# Patient Record
Sex: FEMALE | Race: WHITE | NOT HISPANIC OR LATINO | Employment: FULL TIME | URBAN - METROPOLITAN AREA
[De-identification: names, ages, dates, MRNs, and addresses within clinical notes are randomized per-mention and may not be internally consistent; named-entity substitution may affect disease eponyms.]

---

## 2017-03-30 DIAGNOSIS — I10 ESSENTIAL HYPERTENSION WITH GOAL BLOOD PRESSURE LESS THAN 140/90: ICD-10-CM

## 2017-03-30 NOTE — TELEPHONE ENCOUNTER
lisinopril (PRINIVIL,ZESTRIL) 5 MG tablet      Last Written Prescription Date: 10/3/16  Last Fill Quantity: 90, # refills: 1  Last Office Visit with G, [facility] or [facility] prescribing provider: 10/3/16       Potassium   Date Value Ref Range Status   10/03/2016 3.7 3.4 - 5.3 mmol/L Final     Creatinine   Date Value Ref Range Status   10/03/2016 0.84 0.52 - 1.04 mg/dL Final     BP Readings from Last 3 Encounters:   07/08/16 110/80   04/06/16 122/88   02/12/16 130/82

## 2017-03-31 RX ORDER — LISINOPRIL 5 MG/1
5 TABLET ORAL DAILY
Qty: 90 TABLET | Refills: 1 | Status: SHIPPED | OUTPATIENT
Start: 2017-03-31 | End: 2017-09-30

## 2017-04-01 DIAGNOSIS — I10 ESSENTIAL HYPERTENSION WITH GOAL BLOOD PRESSURE LESS THAN 140/90: ICD-10-CM

## 2017-04-03 RX ORDER — LISINOPRIL 5 MG/1
5 TABLET ORAL DAILY
Qty: 1 TABLET | Refills: 0 | Status: SHIPPED | OUTPATIENT
Start: 2017-04-03 | End: 2017-12-06 | Stop reason: DRUGHIGH

## 2017-04-03 NOTE — TELEPHONE ENCOUNTER
lisinopril (PRINIVIL/ZESTRIL) 5 MG tablet      Last Written Prescription Date: 3/31/17  Last Fill Quantity: 90, # refills: 1  Last Office Visit with [facility], [facility] or [facility] prescribing provider: 10/3/16       Potassium   Date Value Ref Range Status   10/03/2016 3.7 3.4 - 5.3 mmol/L Final     Creatinine   Date Value Ref Range Status   10/03/2016 0.84 0.52 - 1.04 mg/dL Final     BP Readings from Last 3 Encounters:   07/08/16 110/80   04/06/16 122/88   02/12/16 130/82

## 2017-06-17 DIAGNOSIS — J01.90 ACUTE SINUSITIS WITH SYMPTOMS > 10 DAYS: ICD-10-CM

## 2017-06-19 NOTE — TELEPHONE ENCOUNTER
FLUTICASONE PROP 50 MCG SPRAY        Last Written Prescription Date: 12/14/16  Last Fill Quantity: 48,  # refills: 1   Last Office Visit with [facility], [facility] or [facility] prescribing provider: 10/03/16

## 2017-06-20 RX ORDER — FLUTICASONE PROPIONATE 50 MCG
SPRAY, SUSPENSION (ML) NASAL
Qty: 48 G | Refills: 0 | Status: SHIPPED | OUTPATIENT
Start: 2017-06-20 | End: 2017-09-24

## 2017-09-06 DIAGNOSIS — J45.20 MILD INTERMITTENT ASTHMA WITHOUT COMPLICATION: ICD-10-CM

## 2017-09-06 RX ORDER — ALBUTEROL SULFATE 90 UG/1
AEROSOL, METERED RESPIRATORY (INHALATION)
Qty: 1 INHALER | Refills: 0 | Status: SHIPPED | OUTPATIENT
Start: 2017-09-06 | End: 2021-12-22

## 2017-09-06 NOTE — TELEPHONE ENCOUNTER
albuterol (PROAIR HFA, PROVENTIL HFA, VENTOLIN HFA) 108 (90 BASE) MCG/ACT inhaler      Last Written Prescription Date: 7/8/16  Last Fill Quantity: 1,  # refills: 1   Last Office Visit with [facility], [facility] or [facility] prescribing provider: 7/8/16

## 2017-09-06 NOTE — TELEPHONE ENCOUNTER
Medication is being filled for 1 time refill only due to:   Over due for office visit and/or labs   ANA Cronin  RN/González Garcia

## 2017-09-08 DIAGNOSIS — L40.9 SCALP PSORIASIS: ICD-10-CM

## 2017-09-08 NOTE — TELEPHONE ENCOUNTER
fluocinonide (LIDEX) 0.05 % external solution      Last Written Prescription Date: 4/6/16  Last Fill Quantity: 60ml,  # refills: 0   Last Office Visit with [facility], [facility] or [facility] prescribing provider: 10/3/16

## 2017-09-11 RX ORDER — FLUOCINONIDE TOPICAL SOLUTION USP, 0.05% 0.5 MG/ML
SOLUTION TOPICAL
Qty: 60 ML | Refills: 0 | Status: SHIPPED | OUTPATIENT
Start: 2017-09-11 | End: 2017-12-06

## 2017-09-11 NOTE — TELEPHONE ENCOUNTER
Prescription approved per [facility] Refill Protocol.  Diamante Vergara RN

## 2017-09-24 DIAGNOSIS — J01.90 ACUTE SINUSITIS WITH SYMPTOMS > 10 DAYS: ICD-10-CM

## 2017-09-25 RX ORDER — FLUTICASONE PROPIONATE 50 MCG
SPRAY, SUSPENSION (ML) NASAL
Qty: 1 BOTTLE | Refills: 0 | Status: SHIPPED | OUTPATIENT
Start: 2017-09-25 | End: 2017-12-06

## 2017-09-25 NOTE — TELEPHONE ENCOUNTER
FLUTICASONE PROP 50 MCG SPRAY        Last Written Prescription Date: 6/20/17  Last Fill Quantity: 48,  # refills: 0   Last Office Visit with [facility], [facility] or [facility] prescribing provider: 10/03/16

## 2017-09-30 DIAGNOSIS — I10 ESSENTIAL HYPERTENSION WITH GOAL BLOOD PRESSURE LESS THAN 140/90: ICD-10-CM

## 2017-10-02 RX ORDER — LISINOPRIL 5 MG/1
5 TABLET ORAL DAILY
Qty: 30 TABLET | Refills: 0 | Status: SHIPPED | OUTPATIENT
Start: 2017-10-02 | End: 2017-10-30

## 2017-10-02 NOTE — TELEPHONE ENCOUNTER
lisinopril (PRINIVIL/ZESTRIL) 5 MG tablet      Last Written Prescription Date: 3/31/17  Last Fill Quantity: 90, # refills: 1  Last Office Visit with [facility], [facility] or [facility] prescribing provider: 10/3/16       Potassium   Date Value Ref Range Status   10/03/2016 3.7 3.4 - 5.3 mmol/L Final     Creatinine   Date Value Ref Range Status   10/03/2016 0.84 0.52 - 1.04 mg/dL Final     BP Readings from Last 3 Encounters:   07/08/16 110/80   04/06/16 122/88   02/12/16 130/82

## 2017-10-29 DIAGNOSIS — J01.90 ACUTE SINUSITIS WITH SYMPTOMS > 10 DAYS: ICD-10-CM

## 2017-10-30 DIAGNOSIS — I10 ESSENTIAL HYPERTENSION WITH GOAL BLOOD PRESSURE LESS THAN 140/90: ICD-10-CM

## 2017-10-30 RX ORDER — LISINOPRIL 5 MG/1
5 TABLET ORAL DAILY
Qty: 30 TABLET | Refills: 0 | Status: SHIPPED | OUTPATIENT
Start: 2017-10-30 | End: 2017-12-06

## 2017-10-30 NOTE — TELEPHONE ENCOUNTER
Medication is being filled for 1 time refill only due to:  Patient needs labs bmp. Future labs ordered yes. Patient needs to be seen because it has been more than one year since last visit.   Geoff Tim RN

## 2017-10-30 NOTE — TELEPHONE ENCOUNTER
FLUTICASONE PROP 50 MCG SPRAY        Last Written Prescription Date: 9/25/17  Last Fill Quantity: 1,  # refills: 0   Last Office Visit with [facility], [facility] or [facility] prescribing provider: 10/03/16

## 2017-10-30 NOTE — TELEPHONE ENCOUNTER
Routing refill request to provider for review/approval because:  Mya given x1 and patient did not follow up, please advise  Geoff Tim RN

## 2017-10-31 RX ORDER — FLUTICASONE PROPIONATE 50 MCG
SPRAY, SUSPENSION (ML) NASAL
Qty: 16 ML | Refills: 0 | OUTPATIENT
Start: 2017-10-31

## 2017-11-29 DIAGNOSIS — I10 ESSENTIAL HYPERTENSION WITH GOAL BLOOD PRESSURE LESS THAN 140/90: ICD-10-CM

## 2017-11-29 RX ORDER — LISINOPRIL 5 MG/1
TABLET ORAL
Qty: 30 TABLET | Refills: 0 | OUTPATIENT
Start: 2017-11-29

## 2017-11-29 NOTE — TELEPHONE ENCOUNTER
Routing refill request to provider for review/approval because:  Mya given x1 and patient did not follow up, please advise  Patient needs to be seen because it has been more than 1 year since last office visit. 10/3/16.  Geoff Tim RN

## 2017-11-29 NOTE — TELEPHONE ENCOUNTER
Message left on personal identified answering machine asking Piper to call and schedule an appointment.  Geoff Tim RN

## 2017-12-06 ENCOUNTER — OFFICE VISIT (OUTPATIENT)
Dept: FAMILY MEDICINE | Facility: CLINIC | Age: 42
End: 2017-12-06
Payer: COMMERCIAL

## 2017-12-06 VITALS
SYSTOLIC BLOOD PRESSURE: 148 MMHG | HEART RATE: 104 BPM | HEIGHT: 63 IN | WEIGHT: 197.2 LBS | BODY MASS INDEX: 34.94 KG/M2 | DIASTOLIC BLOOD PRESSURE: 92 MMHG | TEMPERATURE: 98.7 F | OXYGEN SATURATION: 98 %

## 2017-12-06 DIAGNOSIS — J45.20 MILD INTERMITTENT ASTHMA WITHOUT COMPLICATION: ICD-10-CM

## 2017-12-06 DIAGNOSIS — I10 ESSENTIAL HYPERTENSION WITH GOAL BLOOD PRESSURE LESS THAN 140/90: Primary | ICD-10-CM

## 2017-12-06 DIAGNOSIS — Z13.220 SCREENING FOR LIPOID DISORDERS: ICD-10-CM

## 2017-12-06 DIAGNOSIS — J30.2 CHRONIC SEASONAL ALLERGIC RHINITIS, UNSPECIFIED TRIGGER: ICD-10-CM

## 2017-12-06 DIAGNOSIS — L40.9 SCALP PSORIASIS: ICD-10-CM

## 2017-12-06 PROCEDURE — 99213 OFFICE O/P EST LOW 20 MIN: CPT | Performed by: PHYSICIAN ASSISTANT

## 2017-12-06 RX ORDER — FEXOFENADINE HCL 180 MG/1
180 TABLET ORAL DAILY
Qty: 30 TABLET | Refills: 1 | COMMUNITY
Start: 2017-12-06

## 2017-12-06 RX ORDER — LISINOPRIL 10 MG/1
10 TABLET ORAL DAILY
Qty: 90 TABLET | Refills: 1 | Status: SHIPPED | OUTPATIENT
Start: 2017-12-06 | End: 2018-03-04

## 2017-12-06 RX ORDER — FLUTICASONE PROPIONATE 50 MCG
SPRAY, SUSPENSION (ML) NASAL
Qty: 1 BOTTLE | Refills: 11 | Status: SHIPPED | OUTPATIENT
Start: 2017-12-06 | End: 2019-05-05

## 2017-12-06 RX ORDER — FLUOCINONIDE TOPICAL SOLUTION USP, 0.05% 0.5 MG/ML
SOLUTION TOPICAL DAILY
Qty: 60 ML | Refills: 1 | Status: SHIPPED | OUTPATIENT
Start: 2017-12-06 | End: 2020-03-22

## 2017-12-06 NOTE — PROGRESS NOTES
"SUBJECTIVE:                                                    Emerald Pearl is a 42 year old female who presents to clinic today for the following health issues:    Hypertension Follow-up      Outpatient blood pressures are not being checked.    Low Salt Diet: no added salt    Amount of exercise or physical activity: None, has sprain in right ankle, very stress     Problems taking medications regularly: No    Medication side effects: none    Diet: regular (no restrictions)    Recent ankle sprain, improving, seen in ED a couple weeks ago  Joining the [employer] with her , who has muscular dystrophy  Allegra did help more than claritin for year round allergies, also uses flonase  She is trying to take control of her health. Has been very stressful at work, recently started working with a psychologist which helps  Denies CP or SOB     Problem list and histories reviewed & adjusted, as indicated.  Additional history: none    BP Readings from Last 3 Encounters:   12/06/17 (!) 148/92   07/08/16 110/80   04/06/16 122/88    Wt Readings from Last 3 Encounters:   12/06/17 197 lb 3.2 oz (89.4 kg)   07/08/16 198 lb 3.2 oz (89.9 kg)   04/06/16 195 lb (88.5 kg)         Labs reviewed in EPIC      ROS:  Other than noted above, general, HEENT, respiratory, cardiac, MS, and gastrointestinal systems are negative.     OBJECTIVE:                                                    BP (!) 148/92  Pulse 104  Temp 98.7  F (37.1  C) (Tympanic)  Ht 5' 2.84\" (1.596 m)  Wt 197 lb 3.2 oz (89.4 kg)  SpO2 98%  BMI 35.12 kg/m2 Body mass index is 35.12 kg/(m^2).   GENERAL: healthy, alert, well nourished, well hydrated, no distress  RESP: lungs clear to auscultation - no rales, no rhonchi, no wheezes  CV: regular rates and rhythm, normal S1 S2, no S3 or S4 and no murmur, no click or rub -       ASSESSMENT/PLAN:                                                      ASSESSMENT/PLAN:      ICD-10-CM    1. Essential hypertension with goal blood " pressure less than 140/90 I10 lisinopril (PRINIVIL/ZESTRIL) 10 MG tablet     **Basic metabolic panel FUTURE 1yr   2. 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 Chronic seasonal allergic rhinitis, unspecified trigger J30.2 fluticasone (FLONASE) 50 MCG/ACT spray   4. Mild intermittent asthma without complication J45.20    5. Screening for lipoid disorders Z13.220 Lipid panel reflex to direct LDL Fasting     Lab appointment made. Will increase lisinopril    Patient Instructions   Increase lisinopril to 10 mg daily  Check fasting labs in a couple weeks (no food or drink for 10-12 hours, except water)  Check your blood pressure in clinic at that time too    Mammogram: For [facility] ([facility], [facility]) imaging departments: call [phone] to schedule     PAP smear next year October or later    Lily Fine PA-C   [facility]

## 2017-12-06 NOTE — MR AVS SNAPSHOT
After Visit Summary   12/6/2017    Emerald Pearl    MRN: [id number]           Patient Information     Date Of Birth          1975        Visit Information        Provider Department      12/6/2017 3:20 PM Lily Fine PA-C [facility]        Today's Diagnoses     Essential hypertension with goal blood pressure less than 140/90    -  1    Scalp psoriasis        Acute sinusitis with symptoms > 10 days        Chronic seasonal allergic rhinitis, unspecified trigger        Mild intermittent asthma without complication        Screening for lipoid disorders          Care Instructions    Increase lisinopril to 10 mg daily  Check fasting labs in a couple weeks (no food or drink for 10-12 hours, except water)  Check your blood pressure in clinic at that time too    Mammogram: For [facility] ([facility], [facility]) imaging departments: call [phone] to schedule     PAP smear next year October or later          Follow-ups after your visit        Your next 10 appointments already scheduled     Dec 22, 2017  8:00 AM CST   LAB with [facility] ([facility])    [address] [address] [phone]           Please do not eat 10-12 hours before your appointment if you are coming in fasting for labs on lipids, cholesterol, or glucose (sugar). This does not apply to pregnant women. Water, hot tea and black coffee (with nothing added) are okay. Do not drink other fluids, diet soda or chew gum.              Future tests that were ordered for you today     Open Future Orders        Priority Expected Expires Ordered    **Basic metabolic panel FUTURE 1yr Routine 11/6/2018 12/6/2018 12/6/2017    Lipid panel reflex to direct LDL Fasting Routine 11/6/2018 12/6/2018 12/6/2017            Who to contact     Normal or non-critical lab and imaging results will be communicated to you by MyChart, letter or phone within 4 business days after the clinic has  "received the results. If you do not hear from us within 7 days, please contact the clinic through [employer] or phone. If you have a critical or abnormal lab result, we will notify you by phone as soon as possible.  Submit refill requests through [employer] or call your pharmacy and they will forward the refill request to us. Please allow 3 business days for your refill to be completed.          If you need to speak with a  for additional information , please call: [phone]             Additional Information About Your Visit        [employer] Information     [employer] gives you secure access to your electronic health record. If you see a primary care provider, you can also send messages to your care team and make appointments. If you have questions, please call your primary care clinic.  If you do not have a primary care provider, please call [phone] and they will assist you.        Care EveryWhere ID     This is your Care EveryWhere ID. This could be used by other organizations to access your [facility] medical records  [id number]        Your Vitals Were     Pulse Temperature Height Pulse Oximetry BMI (Body Mass Index)       104 98.7  F (37.1  C) (Tympanic) 5' 2.84\" (1.596 m) 98% 35.12 kg/m2        Blood Pressure from Last 3 Encounters:   12/06/17 (!) 148/92   07/08/16 110/80   04/06/16 122/88    Weight from Last 3 Encounters:   12/06/17 197 lb 3.2 oz (89.4 kg)   07/08/16 198 lb 3.2 oz (89.9 kg)   04/06/16 195 lb (88.5 kg)              We Performed the Following     Asthma Action Plan (AAP)          Today's Medication Changes          These changes are accurate as of: 12/6/17  4:17 PM.  If you have any questions, ask your nurse or doctor.               These medicines have changed or have updated prescriptions.        Dose/Directions    fluocinonide 0.05 % solution   Commonly known as:  LIDEX   This may have changed:  See the new instructions.   Used for:  Scalp psoriasis   Changed by:  Babatunde, " DARCIE Bautista        Apply topically daily   Quantity:  60 mL   Refills:  1       lisinopril 10 MG tablet   Commonly known as:  PRINIVIL/ZESTRIL   This may have changed:    - medication strength  - how much to take  - Another medication with the same name was removed. Continue taking this medication, and follow the directions you see here.   Used for:  Essential hypertension with goal blood pressure less than 140/90   Changed by:  Lily Fine PA-C        Dose:  10 mg   Take 1 tablet (10 mg) by mouth daily   Quantity:  90 tablet   Refills:  1            Where to get your medicines      These medications were sent to [facility]/pharmacy #[id number] - [place], [place] - [address]  [address], [place] [address]     Phone:  [phone]     fluocinonide 0.05 % solution    fluticasone 50 MCG/ACT spray    lisinopril 10 MG tablet                Primary Care Provider Office Phone # Fax #    Lily Fine PA-C [phone] [address] [address]        Equal Access to Services     [facility]: Hadii aad ku hadasho Soomaali, waaxda luqadaha, qaybta kaalmada adeegyada, waxay idiin hayalejandra mccall . So [facility] [phone].    ATENCIÓN: Si habla español, tiene a hickey disposición servicios gratuitos de asistencia lingüística. Llame al [phone].    We comply with applicable federal civil rights laws and [place] laws. We do not discriminate on the basis of race, color, national origin, age, disability, sex, sexual orientation, or gender identity.            Thank you!     Thank you for choosing [facility]  for your care. Our goal is always to provide you with excellent care. Hearing back from our patients is one way we can continue to improve our services. Please take a few minutes to complete the written survey that you may receive in the mail after your visit with us. Thank you!             Your Updated Medication List - Protect others around you:  Learn how to safely use, store and throw away your medicines at www.disposemymeds.org.          This list is accurate as of: 12/6/17  4:17 PM.  Always use your most recent med list.                   Brand Name Dispense Instructions for use Diagnosis    albuterol 108 (90 BASE) MCG/ACT Inhaler    PROAIR HFA/PROVENTIL HFA/VENTOLIN HFA    1 Inhaler    Inhale 2 puffs krishna lungs q 6 hrs for SOB/wheezing overdue for office appt with  Pankratz plz make appt now before further refills Sept 2017    Mild intermittent asthma without complication       diphenhydrAMINE 25 MG tablet    BENADRYL    60 tablet    Take 1-2 tablets (25-50 mg) by mouth every 6 hours as needed for itching or allergies        fexofenadine 180 MG tablet    ALLEGRA    30 tablet    Take 1 tablet (180 mg) by mouth daily        fluocinonide 0.05 % solution    LIDEX    60 mL    Apply topically daily    Scalp psoriasis       fluticasone 50 MCG/ACT spray    FLONASE    1 Bottle    SPRAY 1-2 SPRAYS INTO BOTH NOSTRILS DAILY (Needs follow-up appointment for this medication)    Acute sinusitis with symptoms > 10 days       lisinopril 10 MG tablet    PRINIVIL/ZESTRIL    90 tablet    Take 1 tablet (10 mg) by mouth daily    Essential hypertension with goal blood pressure less than 140/90       Olopatadine HCl 0.7 % Soln     25 mL    Apply 1 drop to eye 2 times daily    Seasonal allergic rhinitis

## 2017-12-06 NOTE — NURSING NOTE
"Chief Complaint   Patient presents with     Recheck Medication       Initial BP (!) 148/92  Pulse 104  Temp 98.7  F (37.1  C) (Tympanic)  Ht 5' 2.84\" (1.596 m)  Wt 197 lb 3.2 oz (89.4 kg)  SpO2 98%  BMI 35.12 kg/m2 Estimated body mass index is 35.12 kg/(m^2) as calculated from the following:    Height as of this encounter: 5' 2.84\" (1.596 m).    Weight as of this encounter: 197 lb 3.2 oz (89.4 kg).  Medication Reconciliation: complete   Sherine Chung CMA  "

## 2017-12-06 NOTE — PATIENT INSTRUCTIONS
Increase lisinopril to 10 mg daily  Check fasting labs in a couple weeks (no food or drink for 10-12 hours, except water)  Check your blood pressure in clinic at that time too    Mammogram: For [facility] ([place] [place]) imaging departments: call [phone] to schedule     PAP smear next year October or later

## 2017-12-07 ASSESSMENT — ASTHMA QUESTIONNAIRES: ACT_TOTALSCORE: 25

## 2017-12-11 ENCOUNTER — ALLIED HEALTH/NURSE VISIT (OUTPATIENT)
Dept: FAMILY MEDICINE | Facility: CLINIC | Age: 42
End: 2017-12-11
Payer: COMMERCIAL

## 2017-12-11 VITALS — DIASTOLIC BLOOD PRESSURE: 86 MMHG | SYSTOLIC BLOOD PRESSURE: 136 MMHG

## 2017-12-11 DIAGNOSIS — I10 HTN (HYPERTENSION): Primary | ICD-10-CM

## 2017-12-11 PROCEDURE — 99207 ZZC NO CHARGE NURSE ONLY: CPT | Performed by: PHYSICIAN ASSISTANT

## 2017-12-11 NOTE — MR AVS SNAPSHOT
After Visit Summary   12/11/2017    Emerald Pearl    MRN: [id number]           Patient Information     Date Of Birth          1975        Visit Information        Provider Department      12/11/2017 4:48 PM Lily Fine PA-C [facility]        Today's Diagnoses     HTN (hypertension)    -  1       Follow-ups after your visit        Your next 10 appointments already scheduled     Dec 22, 2017  8:00 AM CST   LAB with [facility] ([facility])    [address] [address]   [phone]           Please do not eat 10-12 hours before your appointment if you are coming in fasting for labs on lipids, cholesterol, or glucose (sugar). This does not apply to pregnant women. Water, hot tea and black coffee (with nothing added) are okay. Do not drink other fluids, diet soda or chew gum.            Dec 22, 2017  9:00 AM CST   Nurse Only with [facility] CMA/LPN   [facility] ([facility])    [address] [address]   [phone]              Who to contact     Normal or non-critical lab and imaging results will be communicated to you by MyChart, letter or phone within 4 business days after the clinic has received the results. If you do not hear from us within 7 days, please contact the clinic through [email or web address] or phone. If you have a critical or abnormal lab result, we will notify you by phone as soon as possible.  Submit refill requests through [employer] or call your pharmacy and they will forward the refill request to us. Please allow 3 business days for your refill to be completed.          If you need to speak with a  for additional information , please call: [phone]             Additional Information About Your Visit        [email or web address] Information     [employer] gives you secure access to your electronic health record. If you see a primary care provider, you can also send messages to your care team  and make appointments. If you have questions, please call your primary care clinic.  If you do not have a primary care provider, please call [phone] and they will assist you.        Care EveryWhere ID     This is your Care EveryWhere ID. This could be used by other organizations to access your [facility] medical records  [id number]         Blood Pressure from Last 3 Encounters:   12/11/17 136/86   12/06/17 (!) 148/92   07/08/16 110/80    Weight from Last 3 Encounters:   12/06/17 197 lb 3.2 oz (89.4 kg)   07/08/16 198 lb 3.2 oz (89.9 kg)   04/06/16 195 lb (88.5 kg)              Today, you had the following     No orders found for display       Primary Care Provider Office Phone # Fax #    Lily Fine PA-C [phone] [address] [address]        Equal Access to Services     [facility] : Hadii aad ku hadasho Soomaali, waaxda luqadaha, qaybta kaalmada adeegyada, waxay emaniin hayludan bert mccall . So [facility] [phone].    ATENCIÓN: Si hipolito lyle, tiene a hickey disposición servicios gratuitos de asistencia lingüística. Llame al [phone].    We comply with applicable federal civil rights laws and [place] laws. We do not discriminate on the basis of race, color, national origin, age, disability, sex, sexual orientation, or gender identity.            Thank you!     Thank you for choosing [facility]  for your care. Our goal is always to provide you with excellent care. Hearing back from our patients is one way we can continue to improve our services. Please take a few minutes to complete the written survey that you may receive in the mail after your visit with us. Thank you!             Your Updated Medication List - Protect others around you: Learn how to safely use, store and throw away your medicines at www.disposemymeds.org.          This list is accurate as of: 12/11/17  4:48 PM.  Always use your most recent med list.                   Brand Name  Dispense Instructions for use Diagnosis    albuterol 108 (90 BASE) MCG/ACT Inhaler    PROAIR HFA/PROVENTIL HFA/VENTOLIN HFA    1 Inhaler    Inhale 2 puffs krishna lungs q 6 hrs for SOB/wheezing overdue for office appt with  Pankratz plz make appt now before further refills Sept 2017    Mild intermittent asthma without complication       diphenhydrAMINE 25 MG tablet    BENADRYL    60 tablet    Take 1-2 tablets (25-50 mg) by mouth every 6 hours as needed for itching or allergies        fexofenadine 180 MG tablet    ALLEGRA    30 tablet    Take 1 tablet (180 mg) by mouth daily        fluocinonide 0.05 % solution    LIDEX    60 mL    Apply topically daily    Scalp psoriasis       fluticasone 50 MCG/ACT spray    FLONASE    1 Bottle    SPRAY 1-2 SPRAYS INTO BOTH NOSTRILS DAILY (Needs follow-up appointment for this medication)    Chronic seasonal allergic rhinitis, unspecified trigger       lisinopril 10 MG tablet    PRINIVIL/ZESTRIL    90 tablet    Take 1 tablet (10 mg) by mouth daily    Essential hypertension with goal blood pressure less than 140/90       Olopatadine HCl 0.7 % Soln     25 mL    Apply 1 drop to eye 2 times daily    Seasonal allergic rhinitis

## 2017-12-11 NOTE — PROGRESS NOTES
Emerald Pearl is enrolled/participating in the retail pharmacy Blood Pressure Goals Achievement Program (BPGAP).  Emerald Pearl was evaluated at [facility] on December 11, 2017 at which time her blood pressure was:    BP Readings from Last 3 Encounters:   12/11/17 136/86   12/06/17 (!) 148/92   07/08/16 110/80     Reviewed lifestyle modifications for blood pressure control and reduction: including making healthy food choices, managing weight, getting regular exercise, smoking cessation, reducing alcohol consumption, monitoring blood pressure regularly.     Emerald Pearl is not experiencing symptoms.    Follow-Up: BP is at goal of < 140/90mmHg (patient 18+ years of age with or without diabetes).  Recommended follow-up at pharmacy in 6 months.     Recommendation to Provider: no change    Emerald Pearl was evaluated for enrollment into the PGEN study today.    Patient eligible for enrollment:  No  Patient interested in enrollment:  No    Completed by:     Libia Mcneill, PharmD  [facility]  [phone]

## 2017-12-20 ENCOUNTER — TELEPHONE (OUTPATIENT)
Dept: FAMILY MEDICINE | Facility: CLINIC | Age: 42
End: 2017-12-20

## 2017-12-20 ENCOUNTER — OFFICE VISIT (OUTPATIENT)
Dept: FAMILY MEDICINE | Facility: CLINIC | Age: 42
End: 2017-12-20
Payer: COMMERCIAL

## 2017-12-20 VITALS
BODY MASS INDEX: 34.45 KG/M2 | DIASTOLIC BLOOD PRESSURE: 94 MMHG | HEART RATE: 95 BPM | OXYGEN SATURATION: 100 % | TEMPERATURE: 98.5 F | HEIGHT: 63 IN | WEIGHT: 194.4 LBS | SYSTOLIC BLOOD PRESSURE: 142 MMHG

## 2017-12-20 DIAGNOSIS — R07.0 THROAT PAIN: Primary | ICD-10-CM

## 2017-12-20 LAB
DEPRECATED S PYO AG THROAT QL EIA: NORMAL
SPECIMEN SOURCE: NORMAL

## 2017-12-20 PROCEDURE — 87880 STREP A ASSAY W/OPTIC: CPT | Performed by: FAMILY MEDICINE

## 2017-12-20 PROCEDURE — 99213 OFFICE O/P EST LOW 20 MIN: CPT | Performed by: FAMILY MEDICINE

## 2017-12-20 PROCEDURE — 87070 CULTURE OTHR SPECIMN AEROBIC: CPT | Performed by: FAMILY MEDICINE

## 2017-12-20 NOTE — NURSING NOTE
"Chief Complaint   Patient presents with     Throat Pain       Initial BP (!) 143/102 (BP Location: Right arm, Patient Position: Sitting, Cuff Size: Adult Large)  Pulse 95  Temp 98.5  F (36.9  C) (Tympanic)  Ht 5' 2.84\" (1.596 m)  Wt 194 lb 6.4 oz (88.2 kg)  SpO2 100%  BMI 34.61 kg/m2 Estimated body mass index is 34.61 kg/(m^2) as calculated from the following:    Height as of this encounter: 5' 2.84\" (1.596 m).    Weight as of this encounter: 194 lb 6.4 oz (88.2 kg).  Medication Reconciliation: complete   Monica Alvarez CMA  "

## 2017-12-20 NOTE — TELEPHONE ENCOUNTER
Reason for call:  Patient reporting a symptom    Symptom or request: Emerald vomited last evening and has a very sore throat.  She states that her son had strep and she wants to make sure she doesn't have it.  Please call and assess. Thank you..Syl Baum    Duration (how long have symptoms been present): unknown    Have you been treated for this before? No    Additional comments: none    Phone Number patient can be reached at:  Cell number on file:    Telephone Information:   Mobile [phone]       Best Time:  Any time    Can we leave a detailed message on this number:  YES    Call taken on 12/20/2017 at 8:29 AM by Syl Baum

## 2017-12-20 NOTE — MR AVS SNAPSHOT
After Visit Summary   12/20/2017    Emerald Pearl    MRN: [id number]           Patient Information     Date Of Birth          1975        Visit Information        Provider Department      12/20/2017 2:00 PM Rangel Bernal MD [facility]        Today's Diagnoses     Throat pain    -  1       Follow-ups after your visit        Your next 10 appointments already scheduled     Dec 22, 2017  9:00 AM CST   Nurse Only with [place] HU CMA/LPN   [facility]    [address] [address] [phone]            Jan 12, 2018  7:45 AM CST   LAB with HU LAB   [facility]    [address] [id number] [phone]           Please do not eat 10-12 hours before your appointment if you are coming in fasting for labs on lipids, cholesterol, or glucose (sugar). This does not apply to pregnant women. Water, hot tea and black coffee (with nothing added) are okay. Do not drink other fluids, diet soda or chew gum.              Who to contact     Normal or non-critical lab and imaging results will be communicated to you by LoopIthart, letter or phone within 4 business days after the clinic has received the results. If you do not hear from us within 7 days, please contact the clinic through Buysightt or phone. If you have a critical or abnormal lab result, we will notify you by phone as soon as possible.  Submit refill requests through Shopparity or call your pharmacy and they will forward the refill request to us. Please allow 3 business days for your refill to be completed.          If you need to speak with a  for additional information , please call: [phone]             Additional Information About Your Visit        Shopparity Information     Shopparity gives you secure access to your electronic health record. If you see a primary care provider, you can also send messages to your care team and make  "appointments. If you have questions, please call your primary care clinic.  If you do not have a primary care provider, please call [phone] and they will assist you.        Care EveryWhere ID     This is your Care EveryWhere ID. This could be used by other organizations to access your [place] medical records  [id number]        Your Vitals Were     Pulse Temperature Height Pulse Oximetry BMI (Body Mass Index)       95 98.5  F (36.9  C) (Tympanic) 5' 2.84\" (1.596 m) 100% 34.61 kg/m2        Blood Pressure from Last 3 Encounters:   12/20/17 (!) 142/94   12/11/17 136/86   12/06/17 (!) 148/92    Weight from Last 3 Encounters:   12/20/17 194 lb 6.4 oz (88.2 kg)   12/06/17 197 lb 3.2 oz (89.4 kg)   07/08/16 198 lb 3.2 oz (89.9 kg)              We Performed the Following     Strep, Rapid Screen     Throat Culture Aerobic Bacterial        Primary Care Provider Office Phone # Fax #    Lily Fine PA-C [phone] [address] [facility] [address]        Equal Access to Services     DREW HARDING AH: Hadii romeo patteno Sojoel, waaxda luqadaha, qaybta kaalmada adeegyada, laith dacosta. So [facility] [phone].    ATENCIÓN: Si habla español, tiene a hickey disposición servicios gratuitos de asistencia lingüística. Llame al [phone].    We comply with applicable federal civil rights laws and [place] laws. We do not discriminate on the basis of race, color, national origin, age, disability, sex, sexual orientation, or gender identity.            Thank you!     Thank you for choosing [facility]  for your care. Our goal is always to provide you with excellent care. Hearing back from our patients is one way we can continue to improve our services. Please take a few minutes to complete the written survey that you may receive in the mail after your visit with us. Thank you!             Your Updated Medication List - Protect others around you: Learn how to " safely use, store and throw away your medicines at www.disposemymeds.org.          This list is accurate as of: 12/20/17 11:59 PM.  Always use your most recent med list.                   Brand Name Dispense Instructions for use Diagnosis    albuterol 108 (90 BASE) MCG/ACT Inhaler    PROAIR HFA/PROVENTIL HFA/VENTOLIN HFA    1 Inhaler    Inhale 2 puffs krishna lungs q 6 hrs for SOB/wheezing overdue for office appt with  Pankratz plz make appt now before further refills Sept 2017    Mild intermittent asthma without complication       diphenhydrAMINE 25 MG tablet    BENADRYL    60 tablet    Take 1-2 tablets (25-50 mg) by mouth every 6 hours as needed for itching or allergies        fexofenadine 180 MG tablet    ALLEGRA    30 tablet    Take 1 tablet (180 mg) by mouth daily        fluocinonide 0.05 % solution    LIDEX    60 mL    Apply topically daily    Scalp psoriasis       fluticasone 50 MCG/ACT spray    FLONASE    1 Bottle    SPRAY 1-2 SPRAYS INTO BOTH NOSTRILS DAILY (Needs follow-up appointment for this medication)    Chronic seasonal allergic rhinitis, unspecified trigger       lisinopril 10 MG tablet    PRINIVIL/ZESTRIL    90 tablet    Take 1 tablet (10 mg) by mouth daily    Essential hypertension with goal blood pressure less than 140/90       Olopatadine HCl 0.7 % Soln     25 mL    Apply 1 drop to eye 2 times daily    Seasonal allergic rhinitis

## 2017-12-20 NOTE — TELEPHONE ENCOUNTER
Appointment made for  this afternoon. Son has a sore throat but has not been seen yet. He is being seen at [facility] this afternoon.  Geoff Tim RN

## 2017-12-20 NOTE — PROGRESS NOTES
SUBJECTIVE:                                                    Emerald Pearl is a 42 year old female who presents to clinic today for the following health issues:    ENT Symptoms             Symptoms: cc Present Absent Comment   Fever/Chills  x  chills   Fatigue  x     Muscle Aches   x    Eye Irritation   x    Sneezing   x    Nasal Gene/Drg   x    Sinus Pressure/Pain   x    Loss of smell   x    Dental pain   x    Sore Throat x x     Swollen Glands  x     Ear Pain/Fullness  x     Cough  x     Wheeze   x    Chest Pain   x    Shortness of breath   x    Rash   x    Other  x  dizziness and vomited this morning.     Symptom duration:  two days ago   Symptom severity:  moderate   Treatments tried:  ibuprofen   Contacts:  son was sick     Problem list and histories reviewed & adjusted, as indicated.  Additional history:     Patient Active Problem List   Diagnosis     HTN (hypertension)     Scalp psoriasis     Mild intermittent asthma     Essential hypertension with goal blood pressure less than 140/90     Past Surgical History:   Procedure Laterality Date     NO HISTORY OF SURGERY         Social History   Substance Use Topics     Smoking status: Never Smoker     Smokeless tobacco: Never Used     Alcohol use 0.0 oz/week     0 Standard drinks or equivalent per week      Comment: occ     Family History   Problem Relation Age of Onset     Hypertension Mother      Lipids Mother      Blood Disease Father      leukemia     Hypertension Father      Lipids Father      Hypertension Paternal Grandfather      CEREBROVASCULAR DISEASE Paternal Grandfather      Muscular Dystrophy Son      mild.  has too     Muscular Dystrophy Son      mild.  has too     Breast Cancer Maternal Aunt 60     Breast Cancer Other          ROS:  Constitutional, HEENT, cardiovascular, pulmonary, gi and gu systems are negative, except as otherwise noted.    OBJECTIVE:                                                    BP (!) 143/102 (BP Location:  "Right arm, Patient Position: Sitting, Cuff Size: Adult Large)  Pulse 95  Temp 98.5  F (36.9  C) (Tympanic)  Ht 5' 2.84\" (1.596 m)  Wt 194 lb 6.4 oz (88.2 kg)  SpO2 100%  BMI 34.61 kg/m2 Body mass index is 34.61 kg/(m^2).   GENERAL: healthy, alert, well nourished, well hydrated, no distress  HENT: ear canals- normal; TMs- normal; Nose- normal; Mouth- no ulcers, no lesions  NECK: no tenderness, no adenopathy, no asymmetry, no masses, no stiffness; thyroid- normal to palpation  RESP: lungs clear to auscultation - no rales, no rhonchi, no wheezes  CV: regular rates and rhythm, normal S1 S2, no S3 or S4 and no murmur, no click or rub -  ABDOMEN: soft, no tenderness, no  hepatosplenomegaly, no masses, normal bowel sounds       ASSESSMENT/PLAN:                                                    (R07.0) Throat pain  (primary encounter diagnosis)  Plan: Strep, Rapid Screen, Throat Culture Aerobic         Bacterial    Most likelu URI viral.  return to clinic or call if unimproved in 3-4 days sooner if worse.     reports that she has never smoked. She has never used smokeless tobacco.      [facility]  "

## 2017-12-23 LAB
BACTERIA SPEC CULT: NORMAL
SPECIMEN SOURCE: NORMAL

## 2017-12-29 ENCOUNTER — ALLIED HEALTH/NURSE VISIT (OUTPATIENT)
Dept: FAMILY MEDICINE | Facility: CLINIC | Age: 42
End: 2017-12-29
Payer: COMMERCIAL

## 2017-12-29 VITALS — DIASTOLIC BLOOD PRESSURE: 86 MMHG | SYSTOLIC BLOOD PRESSURE: 128 MMHG

## 2017-12-29 DIAGNOSIS — I10 HTN (HYPERTENSION): Primary | ICD-10-CM

## 2017-12-29 PROCEDURE — 99207 ZZC NO CHARGE NURSE ONLY: CPT | Performed by: PHYSICIAN ASSISTANT

## 2017-12-29 NOTE — MR AVS SNAPSHOT
After Visit Summary   12/29/2017    Emerald Pearl    MRN: [id number]           Patient Information     Date Of Birth          1975        Visit Information        Provider Department      12/29/2017 5:37 PM Lily Fine PA-C [facility]        Today's Diagnoses     HTN (hypertension)    -  1       Follow-ups after your visit        Your next 10 appointments already scheduled     Jan 12, 2018  7:45 AM [facility]   LAB with [facility] ([facility])    [address] [address] [phone]           Please do not eat 10-12 hours before your appointment if you are coming in fasting for labs on lipids, cholesterol, or glucose (sugar). This does not apply to pregnant women. Water, hot tea and black coffee (with nothing added) are okay. Do not drink other fluids, diet soda or chew gum.              Who to contact     Normal or non-critical lab and imaging results will be communicated to you by xG Technologyhart, letter or phone within 4 business days after the clinic has received the results. If you do not hear from us within 7 days, please contact the clinic through xG Technologyhart or phone. If you have a critical or abnormal lab result, we will notify you by phone as soon as possible.  Submit refill requests through Holograam or call your pharmacy and they will forward the refill request to us. Please allow 3 business days for your refill to be completed.          If you need to speak with a  for additional information , please call: [phone]             Additional Information About Your Visit        Holograam Information     Holograam gives you secure access to your electronic health record. If you see a primary care provider, you can also send messages to your care team and make appointments. If you have questions, please call your primary care clinic.  If you do not have a primary care provider, please call [phone] and they will assist  you.        Care EveryWhere ID     This is your Care EveryWhere ID. This could be used by other organizations to access your [place] medical records  [id number]         Blood Pressure from Last 3 Encounters:   12/29/17 128/86   12/20/17 (!) 142/94   12/11/17 136/86    Weight from Last 3 Encounters:   12/20/17 194 lb 6.4 oz (88.2 kg)   12/06/17 197 lb 3.2 oz (89.4 kg)   07/08/16 198 lb 3.2 oz (89.9 kg)              Today, you had the following     No orders found for display       Primary Care Provider Office Phone # Fax #    Lily DARCIE Fine [phone]       [address] [facility] [address]        Equal Access to Services     DREW HARDING : Hadii aad ku hadasho Soomaali, waaxda luqadaha, qaybta kaalmada adeegyada, laith mccall . So [facility] [phone].    ATENCIÓN: Si habla español, tiene a hickey disposición servicios gratuitos de asistencia lingüística. Llame al [phone].    We comply with applicable federal civil rights laws and [place] laws. We do not discriminate on the basis of race, color, national origin, age, disability, sex, sexual orientation, or gender identity.            Thank you!     Thank you for choosing [facility]  for your care. Our goal is always to provide you with excellent care. Hearing back from our patients is one way we can continue to improve our services. Please take a few minutes to complete the written survey that you may receive in the mail after your visit with us. Thank you!             Your Updated Medication List - Protect others around you: Learn how to safely use, store and throw away your medicines at www.disposemymeds.org.          This list is accurate as of: 12/29/17  5:44 PM.  Always use your most recent med list.                   Brand Name Dispense Instructions for use Diagnosis    albuterol 108 (90 BASE) MCG/ACT Inhaler    PROAIR HFA/PROVENTIL HFA/VENTOLIN HFA    1 Inhaler    Inhale 2 puffs krishna lungs q  6 hrs for SOB/wheezing overdue for office appt with  Pankratz plz make appt now before further refills Sept 2017    Mild intermittent asthma without complication       diphenhydrAMINE 25 MG tablet    BENADRYL    60 tablet    Take 1-2 tablets (25-50 mg) by mouth every 6 hours as needed for itching or allergies        fexofenadine 180 MG tablet    ALLEGRA    30 tablet    Take 1 tablet (180 mg) by mouth daily        fluocinonide 0.05 % solution    LIDEX    60 mL    Apply topically daily    Scalp psoriasis       fluticasone 50 MCG/ACT spray    FLONASE    1 Bottle    SPRAY 1-2 SPRAYS INTO BOTH NOSTRILS DAILY (Needs follow-up appointment for this medication)    Chronic seasonal allergic rhinitis, unspecified trigger       lisinopril 10 MG tablet    PRINIVIL/ZESTRIL    90 tablet    Take 1 tablet (10 mg) by mouth daily    Essential hypertension with goal blood pressure less than 140/90       Olopatadine HCl 0.7 % Soln     25 mL    Apply 1 drop to eye 2 times daily    Seasonal allergic rhinitis

## 2017-12-29 NOTE — NURSING NOTE
Emerald Pearl is enrolled/participating in the retail pharmacy Blood Pressure Goals Achievement Program (BPGAP).  Emerald Pearl was evaluated at [facility] on December 29, 2017 at which time her blood pressure was:    BP Readings from Last 3 Encounters:   12/29/17 128/86   12/20/17 (!) 142/94   12/11/17 136/86     Reviewed lifestyle modifications for blood pressure control and reduction: including making healthy food choices, managing weight, getting regular exercise, smoking cessation, reducing alcohol consumption, monitoring blood pressure regularly.     Emerald Pearl is not experiencing symptoms.    Follow-Up: BP is at goal of < 140/90mmHg (patient 18+ years of age with or without diabetes).  Recommended follow-up at pharmacy in 6 months.     Recommendation to Provider: None    Emerald Pearl was evaluated for enrollment into the PGEN study today.    Patient eligible for enrollment:  No  Patient interested in enrollment:  No    Completed by: Ashley Diamond, PharmD  [facility]  [phone]

## 2018-01-02 ENCOUNTER — ALLIED HEALTH/NURSE VISIT (OUTPATIENT)
Dept: FAMILY MEDICINE | Facility: CLINIC | Age: 43
End: 2018-01-02
Payer: COMMERCIAL

## 2018-01-02 ENCOUNTER — MYC MEDICAL ADVICE (OUTPATIENT)
Dept: FAMILY MEDICINE | Facility: CLINIC | Age: 43
End: 2018-01-02

## 2018-01-02 DIAGNOSIS — Z11.1 SCREENING EXAMINATION FOR PULMONARY TUBERCULOSIS: Primary | ICD-10-CM

## 2018-01-02 PROCEDURE — 99207 ZZC NO CHARGE NURSE ONLY: CPT

## 2018-01-02 PROCEDURE — 86580 TB INTRADERMAL TEST: CPT

## 2018-01-03 ENCOUNTER — TELEPHONE (OUTPATIENT)
Dept: FAMILY MEDICINE | Facility: CLINIC | Age: 43
End: 2018-01-03

## 2018-01-05 ENCOUNTER — MYC MEDICAL ADVICE (OUTPATIENT)
Dept: FAMILY MEDICINE | Facility: CLINIC | Age: 43
End: 2018-01-05

## 2018-01-05 ENCOUNTER — ALLIED HEALTH/NURSE VISIT (OUTPATIENT)
Dept: FAMILY MEDICINE | Facility: CLINIC | Age: 43
End: 2018-01-05
Payer: COMMERCIAL

## 2018-01-05 DIAGNOSIS — Z11.1 VISIT FOR MANTOUX TEST: Primary | ICD-10-CM

## 2018-01-05 PROCEDURE — 99207 ZZC NO CHARGE NURSE ONLY: CPT

## 2018-01-05 NOTE — MR AVS SNAPSHOT
After Visit Summary   1/5/2018    Emerald Pearl    MRN: [id number]           Patient Information     Date Of Birth          1975        Visit Information        Provider Department      1/5/2018 9:00 AM [place] HU RN [facility]        Care Instructions    [place] results:   No induration.  No swelling.  No redness.    Diamante Vergara RN            Follow-ups after your visit        Your next 10 appointments already scheduled     Jan 05, 2018  9:00 AM CST   Nurse Only with [place] HU RN   [facility]    [address] [place] [address]   [phone]            Jan 12, 2018  7:45 AM CST   LAB with HU LAB   [facility]    [address] [place] [address]   [phone]           Please do not eat 10-12 hours before your appointment if you are coming in fasting for labs on lipids, cholesterol, or glucose (sugar). This does not apply to pregnant women. Water, hot tea and black coffee (with nothing added) are okay. Do not drink other fluids, diet soda or chew gum.              Who to contact     Normal or non-critical lab and imaging results will be communicated to you by myseekithart, letter or phone within 4 business days after the clinic has received the results. If you do not hear from us within 7 days, please contact the clinic through myseekithart or phone. If you have a critical or abnormal lab result, we will notify you by phone as soon as possible.  Submit refill requests through The Poshpacker or call your pharmacy and they will forward the refill request to us. Please allow 3 business days for your refill to be completed.          If you need to speak with a  for additional information , please call: [phone]             Additional Information About Your Visit        The Poshpacker Information     The Poshpacker gives you secure access to your electronic health record. If you see a primary care provider, you can  also send messages to your care team and make appointments. If you have questions, please call your primary care clinic.  If you do not have a primary care provider, please call [phone] and they will assist you.        Care EveryWhere ID     This is your Care EveryWhere ID. This could be used by other organizations to access your [place] medical records  [id number]         Blood Pressure from Last 3 Encounters:   12/29/17 128/86   12/20/17 (!) 142/94   12/11/17 136/86    Weight from Last 3 Encounters:   12/20/17 194 lb 6.4 oz (88.2 kg)   12/06/17 197 lb 3.2 oz (89.4 kg)   07/08/16 198 lb 3.2 oz (89.9 kg)              Today, you had the following     No orders found for display       Primary Care Provider Office Phone # Fax #    Lily Fine PA-C [phone] [address] [facility] [address]        Equal Access to Services     DREW HARDING : Hadii aad ku hadasho Soomaali, waaxda luqadaha, qaybta kaalmada adeegyada, waxay idiin hayaan adeeg jaron mccall . So [facility] [phone].    ATENCIÓN: Si habla español, tiene a hickey disposición servicios gratuitos de asistencia lingüística. Ll[facility] [phone].    We comply with applicable federal civil rights laws and [place] laws. We do not discriminate on the basis of race, color, national origin, age, disability, sex, sexual orientation, or gender identity.            Thank you!     Thank you for choosing [facility]  for your care. Our goal is always to provide you with excellent care. Hearing back from our patients is one way we can continue to improve our services. Please take a few minutes to complete the written survey that you may receive in the mail after your visit with us. Thank you!             Your Updated Medication List - Protect others around you: Learn how to safely use, store and throw away your medicines at www.disposemymeds.org.          This list is accurate as of: 1/5/18  8:46 AM.  Always use your most  recent med list.                   Brand Name Dispense Instructions for use Diagnosis    albuterol 108 (90 BASE) MCG/ACT Inhaler    PROAIR HFA/PROVENTIL HFA/VENTOLIN HFA    1 Inhaler    Inhale 2 puffs krishna lungs q 6 hrs for SOB/wheezing overdue for office appt with  Pankratz plz make appt now before further refills Sept 2017    Mild intermittent asthma without complication       diphenhydrAMINE 25 MG tablet    BENADRYL    60 tablet    Take 1-2 tablets (25-50 mg) by mouth every 6 hours as needed for itching or allergies        fexofenadine 180 MG tablet    ALLEGRA    30 tablet    Take 1 tablet (180 mg) by mouth daily        fluocinonide 0.05 % solution    LIDEX    60 mL    Apply topically daily    Scalp psoriasis       fluticasone 50 MCG/ACT spray    FLONASE    1 Bottle    SPRAY 1-2 SPRAYS INTO BOTH NOSTRILS DAILY (Needs follow-up appointment for this medication)    Chronic seasonal allergic rhinitis, unspecified trigger       lisinopril 10 MG tablet    PRINIVIL/ZESTRIL    90 tablet    Take 1 tablet (10 mg) by mouth daily    Essential hypertension with goal blood pressure less than 140/90       Olopatadine HCl 0.7 % Soln     25 mL    Apply 1 drop to eye 2 times daily    Seasonal allergic rhinitis

## 2018-01-12 DIAGNOSIS — Z13.220 SCREENING FOR LIPOID DISORDERS: ICD-10-CM

## 2018-01-12 DIAGNOSIS — I10 ESSENTIAL HYPERTENSION WITH GOAL BLOOD PRESSURE LESS THAN 140/90: ICD-10-CM

## 2018-01-12 LAB
ANION GAP SERPL CALCULATED.3IONS-SCNC: 4 MMOL/L (ref 3–14)
BUN SERPL-MCNC: 11 MG/DL (ref 7–30)
CALCIUM SERPL-MCNC: 8.7 MG/DL (ref 8.5–10.1)
CHLORIDE SERPL-SCNC: 106 MMOL/L (ref 94–109)
CHOLEST SERPL-MCNC: 188 MG/DL
CO2 SERPL-SCNC: 26 MMOL/L (ref 20–32)
CREAT SERPL-MCNC: 0.85 MG/DL (ref 0.52–1.04)
GFR SERPL CREATININE-BSD FRML MDRD: 73 ML/MIN/1.7M2
GLUCOSE SERPL-MCNC: 94 MG/DL (ref 70–99)
HDLC SERPL-MCNC: 65 MG/DL
LDLC SERPL CALC-MCNC: 104 MG/DL
NONHDLC SERPL-MCNC: 123 MG/DL
POTASSIUM SERPL-SCNC: 4.2 MMOL/L (ref 3.4–5.3)
SODIUM SERPL-SCNC: 136 MMOL/L (ref 133–144)
TRIGL SERPL-MCNC: 97 MG/DL

## 2018-01-12 PROCEDURE — 36415 COLL VENOUS BLD VENIPUNCTURE: CPT | Performed by: PHYSICIAN ASSISTANT

## 2018-01-12 PROCEDURE — 80048 BASIC METABOLIC PNL TOTAL CA: CPT | Performed by: PHYSICIAN ASSISTANT

## 2018-01-12 PROCEDURE — 80061 LIPID PANEL: CPT | Performed by: PHYSICIAN ASSISTANT

## 2018-02-07 ENCOUNTER — HOSPITAL ENCOUNTER (OUTPATIENT)
Dept: MAMMOGRAPHY | Facility: CLINIC | Age: 43
Discharge: HOME | End: 2018-02-07
Attending: PHYSICIAN ASSISTANT | Admitting: PHYSICIAN ASSISTANT
Payer: COMMERCIAL

## 2018-02-07 DIAGNOSIS — Z12.31 VISIT FOR SCREENING MAMMOGRAM: ICD-10-CM

## 2018-02-07 PROCEDURE — 77067 SCR MAMMO BI INCL CAD: CPT

## 2018-02-12 ENCOUNTER — MYC MEDICAL ADVICE (OUTPATIENT)
Dept: FAMILY MEDICINE | Facility: CLINIC | Age: 43
End: 2018-02-12

## 2018-02-12 ENCOUNTER — TELEPHONE (OUTPATIENT)
Dept: FAMILY MEDICINE | Facility: CLINIC | Age: 43
End: 2018-02-12

## 2018-02-12 DIAGNOSIS — Z20.828 EXPOSURE TO INFLUENZA: Primary | ICD-10-CM

## 2018-02-12 DIAGNOSIS — Z20.828 EXPOSURE TO INFLUENZA: ICD-10-CM

## 2018-02-12 RX ORDER — OSELTAMIVIR PHOSPHATE 45 MG/1
45 CAPSULE ORAL 2 TIMES DAILY
Qty: 28 CAPSULE | Refills: 0 | Status: SHIPPED | OUTPATIENT
Start: 2018-02-12 | End: 2018-02-12

## 2018-02-12 RX ORDER — OSELTAMIVIR PHOSPHATE 45 MG/1
45 CAPSULE ORAL 2 TIMES DAILY
Qty: 28 CAPSULE | Refills: 0 | Status: SHIPPED | OUTPATIENT
Start: 2018-02-12 | End: 2018-07-30

## 2018-02-12 NOTE — TELEPHONE ENCOUNTER
Pharmacist calling needing clarification on Tamiflu script that was send over to them.  It is not normally prescribed for 2 weeks.  Please clarify and call.  Thank you..Syl Baum

## 2018-02-12 NOTE — TELEPHONE ENCOUNTER
"Please clarify for pharmacy: Does \"short prophylaxis course\" mean: 5 days or 7 days or 10 days or 14 days? Current order is twice a day for 14 days. Is this correct? Thank you.  Geoff Tim RN    "

## 2018-02-13 ENCOUNTER — MYC MEDICAL ADVICE (OUTPATIENT)
Dept: FAMILY MEDICINE | Facility: CLINIC | Age: 43
End: 2018-02-13

## 2018-03-04 DIAGNOSIS — I10 ESSENTIAL HYPERTENSION WITH GOAL BLOOD PRESSURE LESS THAN 140/90: ICD-10-CM

## 2018-03-05 NOTE — TELEPHONE ENCOUNTER
"LISINOPRIL 10 MG TABLET        Last Written Prescription Date:  12/6/17  Last Fill Quantity: 90,   # refills: 1  Last Office Visit: 12/20/17  Future Office visit:       Requested Prescriptions   Pending Prescriptions Disp Refills     lisinopril (PRINIVIL/ZESTRIL) 10 MG tablet [Pharmacy Med Name: LISINOPRIL 10 MG TABLET] 90 tablet 1     Sig: TAKE 1 TABLET BY MOUTH EVERY DAY    ACE Inhibitors (Including Combos) Protocol Passed    3/4/2018 10:50 AM       Passed - Blood pressure under 140/90 in past 12 months    BP Readings from Last 3 Encounters:   12/29/17 128/86   12/20/17 (!) 142/94   12/11/17 136/86                Passed - Recent (12 mo) or future (30 days) visit within the authorizing provider's specialty    Patient had office visit in the last year or has a visit in the next 30 days with authorizing provider.  See \"Patient Info\" tab in inbasket, or \"Choose Columns\" in Meds & Orders section of the refill encounter.            Passed - Patient is age 18 or older       Passed - No active pregnancy on record       Passed - Normal serum creatinine on file in past 12 months    Recent Labs   Lab Test  01/12/18   0743   CR  0.85            Passed - Normal serum potassium on file in past 12 months    Recent Labs   Lab Test  01/12/18   0743   POTASSIUM  4.2            Passed - No positive pregnancy test in past 12 months          "

## 2018-03-06 RX ORDER — LISINOPRIL 10 MG/1
TABLET ORAL
Qty: 90 TABLET | Refills: 2 | Status: SHIPPED | OUTPATIENT
Start: 2018-03-06 | End: 2018-05-26

## 2018-03-06 NOTE — TELEPHONE ENCOUNTER
Prescription for lisinopril approved per [facility] Refill Protocol.    Carmen WEN RN    BP Readings from Last 3 Encounters:   12/29/17 128/86   12/20/17 (!) 142/94   12/11/17 136/86

## 2018-05-26 DIAGNOSIS — I10 ESSENTIAL HYPERTENSION WITH GOAL BLOOD PRESSURE LESS THAN 140/90: ICD-10-CM

## 2018-05-29 NOTE — TELEPHONE ENCOUNTER
"LISINOPRIL 10 MG TABLET        Last Written Prescription Date:  3/6/18  Last Fill Quantity: 90,   # refills: 2  Last Office Visit: 12/20/17  Future Office visit:       Requested Prescriptions   Pending Prescriptions Disp Refills     lisinopril (PRINIVIL/ZESTRIL) 10 MG tablet [Pharmacy Med Name: LISINOPRIL 10 MG TABLET] 90 tablet 0     Sig: TAKE 1 TABLET BY MOUTH EVERY DAY    ACE Inhibitors (Including Combos) Protocol Passed    5/26/2018 10:50 AM       Passed - Blood pressure under 140/90 in past 12 months    BP Readings from Last 3 Encounters:   12/29/17 128/86   12/20/17 (!) 142/94   12/11/17 136/86                Passed - Recent (12 mo) or future (30 days) visit within the authorizing provider's specialty    Patient had office visit in the last 12 months or has a visit in the next 30 days with authorizing provider or within the authorizing provider's specialty.  See \"Patient Info\" tab in inbasket, or \"Choose Columns\" in Meds & Orders section of the refill encounter.           Passed - Patient is age 18 or older       Passed - No active pregnancy on record       Passed - Normal serum creatinine on file in past 12 months    Recent Labs   Lab Test  01/12/18   0743   CR  0.85            Passed - Normal serum potassium on file in past 12 months    Recent Labs   Lab Test  01/12/18   0743   POTASSIUM  4.2            Passed - No positive pregnancy test in past 12 months          "

## 2018-05-30 RX ORDER — LISINOPRIL 10 MG/1
TABLET ORAL
Qty: 90 TABLET | Refills: 0 | Status: SHIPPED | OUTPATIENT
Start: 2018-05-30 | End: 2018-08-28

## 2018-05-30 NOTE — TELEPHONE ENCOUNTER
Medication is being filled for 1 time refill only due to:  Patient needs to be seen because she needs bp check..   Geoff Tim RN

## 2018-07-30 ENCOUNTER — TELEPHONE (OUTPATIENT)
Dept: FAMILY MEDICINE | Facility: CLINIC | Age: 43
End: 2018-07-30

## 2018-07-30 NOTE — TELEPHONE ENCOUNTER
"Please advise in Radha's absence: [place].  This morning vomited 5 times ----7:30 AM was the last time. Had 2 episodes of diarrhea.; last one 1:40 PM today. Feels a little nauseated. Denies cramping. Urinating OK. Has had zofran in the past. She is asking for zofran as she said that she \"has to go to work tomorrow.\" She said that she has taken that in the past.   Geoff Tim RN  Advised her to stay on clear liquids for 4 hours; water, gatorade and then gradually advance to BRAT diet and saltines and chicken noodle soup. How do you advise her zofran request?  Thank you.  Geoff Tim RN    "

## 2018-07-30 NOTE — LETTER
[facility]  [address]  [place] [facility] [address]  Phone: [phone]      July 30, 2018      RE: Emerald Pearl  [address]  [place] [facility] [address]        To whom it may concern:    Emerald Pearl is under my professional care.  She can return to work without restrictions 24 hours after her last episode of emesis or diarrhea.         Sincerely,                Dr. Rangel Bernal

## 2018-07-30 NOTE — TELEPHONE ENCOUNTER
Reason for call:  Patient reporting a symptom    Symptom or request: Piper LEFT MESSAGE:  She states that she vomited this morning and wanted to be seen but no appointments available.  Was hoping she could get some zofran.  Please call and assess. Thank you..Syl Baum    Duration (how long have symptoms been present): this morning    Have you been treated for this before? unknown    Phone Number patient can be reached at:  Home number on file [phone] (home)    Best Time:  Any time    Can we leave a detailed message on this number:  YES    Call taken on 7/30/2018 at 1:17 PM by Syl Baum

## 2018-08-28 DIAGNOSIS — I10 ESSENTIAL HYPERTENSION WITH GOAL BLOOD PRESSURE LESS THAN 140/90: ICD-10-CM

## 2018-08-28 RX ORDER — LISINOPRIL 10 MG/1
TABLET ORAL
Qty: 30 TABLET | Refills: 0 | Status: SHIPPED | OUTPATIENT
Start: 2018-08-28 | End: 2018-12-13

## 2018-08-28 NOTE — TELEPHONE ENCOUNTER
Medication is being filled for 1 time refill only due to:  Patient needs to be seen because needs bp follow up appt with Provider.   Geoff Tim RN

## 2018-08-28 NOTE — TELEPHONE ENCOUNTER
"LISINOPRIL 10 MG TABLET        Last Written Prescription Date:  5/30/18  Last Fill Quantity: 90,   # refills: 0  Last Office Visit: 12/20/17  Future Office visit:       Requested Prescriptions   Pending Prescriptions Disp Refills     lisinopril (PRINIVIL/ZESTRIL) 10 MG tablet [Pharmacy Med Name: LISINOPRIL 10 MG TABLET] 90 tablet 0     Sig: TAKE 1 TABLET BY MOUTH EVERY DAY    ACE Inhibitors (Including Combos) Protocol Passed    8/28/2018  2:06 AM       Passed - Blood pressure under 140/90 in past 12 months    BP Readings from Last 3 Encounters:   12/29/17 128/86   12/20/17 (!) 142/94   12/11/17 136/86                Passed - Recent (12 mo) or future (30 days) visit within the authorizing provider's specialty    Patient had office visit in the last 12 months or has a visit in the next 30 days with authorizing provider or within the authorizing provider's specialty.  See \"Patient Info\" tab in inbasket, or \"Choose Columns\" in Meds & Orders section of the refill encounter.           Passed - Patient is age 18 or older       Passed - No active pregnancy on record       Passed - Normal serum creatinine on file in past 12 months    Recent Labs   Lab Test  01/12/18   0743   CR  0.85            Passed - Normal serum potassium on file in past 12 months    Recent Labs   Lab Test  01/12/18   0743   POTASSIUM  4.2            Passed - No positive pregnancy test in past 12 months          "

## 2018-12-12 ENCOUNTER — OFFICE VISIT (OUTPATIENT)
Dept: FAMILY MEDICINE | Facility: CLINIC | Age: 43
End: 2018-12-12
Payer: COMMERCIAL

## 2018-12-12 VITALS
SYSTOLIC BLOOD PRESSURE: 132 MMHG | TEMPERATURE: 99.3 F | BODY MASS INDEX: 34.76 KG/M2 | HEIGHT: 63 IN | DIASTOLIC BLOOD PRESSURE: 86 MMHG | HEART RATE: 100 BPM | WEIGHT: 196.2 LBS

## 2018-12-12 DIAGNOSIS — E55.9 VITAMIN D DEFICIENCY: ICD-10-CM

## 2018-12-12 DIAGNOSIS — Z12.4 SCREENING FOR MALIGNANT NEOPLASM OF CERVIX: ICD-10-CM

## 2018-12-12 DIAGNOSIS — N76.0 BV (BACTERIAL VAGINOSIS): Primary | ICD-10-CM

## 2018-12-12 DIAGNOSIS — B96.89 BV (BACTERIAL VAGINOSIS): Primary | ICD-10-CM

## 2018-12-12 DIAGNOSIS — L91.8 SKIN TAG: ICD-10-CM

## 2018-12-12 DIAGNOSIS — I10 ESSENTIAL HYPERTENSION WITH GOAL BLOOD PRESSURE LESS THAN 140/90: ICD-10-CM

## 2018-12-12 DIAGNOSIS — N89.8 VAGINAL DISCHARGE: ICD-10-CM

## 2018-12-12 DIAGNOSIS — J45.20 MILD INTERMITTENT ASTHMA WITHOUT COMPLICATION: ICD-10-CM

## 2018-12-12 DIAGNOSIS — I10 ESSENTIAL HYPERTENSION: ICD-10-CM

## 2018-12-12 DIAGNOSIS — T19.2XXA RETAINED TAMPON, INITIAL ENCOUNTER: ICD-10-CM

## 2018-12-12 DIAGNOSIS — Z13.220 SCREENING FOR LIPOID DISORDERS: ICD-10-CM
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
## 2018-12-12 LAB
SPECIMEN SOURCE: ABNORMAL
WET PREP SPEC: ABNORMAL

## 2018-12-12 PROCEDURE — 87624 HPV HI-RISK TYP POOLED RSLT: CPT | Performed by: PHYSICIAN ASSISTANT

## 2018-12-12 PROCEDURE — 87210 SMEAR WET MOUNT SALINE/INK: CPT | Performed by: PHYSICIAN ASSISTANT

## 2018-12-12 PROCEDURE — 11200 RMVL SKIN TAGS UP TO&INC 15: CPT | Performed by: PHYSICIAN ASSISTANT

## 2018-12-12 PROCEDURE — 99214 OFFICE O/P EST MOD 30 MIN: CPT | Mod: 25 | Performed by: PHYSICIAN ASSISTANT

## 2018-12-12 PROCEDURE — G0145 SCR C/V CYTO,THINLAYER,RESCR: HCPCS | Performed by: PHYSICIAN ASSISTANT

## 2018-12-12 RX ORDER — METRONIDAZOLE 500 MG/1
500 TABLET ORAL 2 TIMES DAILY
Qty: 14 TABLET | Refills: 0 | Status: SHIPPED | OUTPATIENT
Start: 2018-12-12 | End: 2019-03-06

## 2018-12-12 ASSESSMENT — MIFFLIN-ST. JEOR: SCORE: 1501.55

## 2018-12-12 NOTE — PATIENT INSTRUCTIONS
Metronidazole antibiotics for bacterial vaginosis  Follow up if symptoms worsen or not improving    SAD lamp 10,000 luxe.  Store in [facility] [place] sells them and has a showroom, or many graham online  1/2 hour each morning    You are due for lab tests  - future lab order is already placed in computer system  Fasting labs: no food or drink (except water) for 12 hours before labs, make lab appointment   [facility] Lab Hours  Mon 7:45 am - 6:30 pm   Tues-Fri 7:45 am - 4:45 pm   Ph: [phone] - to schedule

## 2018-12-12 NOTE — PROGRESS NOTES
SUBJECTIVE:   Emerald Pearl is a 43 year old female who presents to clinic today for the following health issues:    Vaginal Symptoms  Onset: Almost 2 weeks    Description:  Vaginal Discharge: started after period, clear now, but did have grayish   Itching (Pruritis): YES  Burning sensation:  no   Odor: YES- very strong odor, this is not normal for her    Accompanying Signs & Symptoms:  Pain with Urination: no   Abdominal Pain: no   Fever: no     History:   Sexually active: YES  New Partner: no   Possibility of Pregnancy:  No    Precipitating factors:   Recent Antibiotic Use: no     Alleviating factors:  none    Therapies Tried and outcome: None    She would like to take vitamin D, worse mood in the winter. Would like level checked  Denies UTI symptoms     Problem list and histories reviewed & adjusted, as indicated.  Additional history: as documented    Patient Active Problem List   Diagnosis     HTN (hypertension)     Scalp psoriasis     Mild intermittent asthma     Essential hypertension with goal blood pressure less than 140/90     Past Surgical History:   Procedure Laterality Date     NO HISTORY OF SURGERY         Social History     Tobacco Use     Smoking status: Never Smoker     Smokeless tobacco: Never Used   Substance Use Topics     Alcohol use: Yes     Alcohol/week: 0.0 oz     Comment: occ     Family History   Problem Relation Age of Onset     Hypertension Mother      Lipids Mother      Blood Disease Father         leukemia     Hypertension Father      Lipids Father      Hypertension Paternal Grandfather      Cerebrovascular Disease Paternal Grandfather      Muscular Dystrophy Son         mild.  has too     Muscular Dystrophy Son         mild.  has too     Breast Cancer Maternal Aunt 60     Breast Cancer Other          Labs reviewed in EPIC    Reviewed and updated as needed this visit by clinical staff  Tobacco  Allergies  Meds  Problems  Med Hx  Surg Hx  Fam Hx  Soc Hx       "  Reviewed and updated as needed this visit by Provider  Tobacco  Allergies  Meds  Problems  Med Hx  Surg Hx  Fam Hx  Soc Hx          ROS:  Other than noted above, general, HEENT, respiratory, cardiac, MS, and gastrointestinal systems are negative.     OBJECTIVE:     /86   Pulse 100   Temp 99.3  F (37.4  C) (Tympanic)   Ht 1.596 m (5' 2.84\")   Wt 89 kg (196 lb 3.2 oz)   LMP 11/21/2018 (Approximate)   BMI 34.93 kg/m     Body mass index is 34.93 kg/m .  GENERAL: healthy, alert and no distress  RESP: lungs clear to auscultation - no rales, rhonchi or wheezes  CV: regular rate and rhythm, normal S1 S2, no S3 or S4, no murmur, click or rub, no peripheral edema and peripheral pulses strong  ABDOMEN: soft, nontender, no hepatosplenomegaly, no masses and bowel sounds normal   (female): normal female external genitalia, normal urethral meatus, vaginal mucosa, normal cervix/adnexa/uterus POSITIVE retained tampon. This was successfully removed. Odor, creamy discharge. After removal, cervix appears friable, some bleeding with PAP smear swab.  MS: no gross musculoskeletal defects noted, no edema  SKIN: POSITIVE right axilla skin tag on stalk    Verbal and written consent obtained.  After cleaning with alcohol and using a sharp iris scissors, 1 skin tags were removed without complication. Hemostasis achieved with pressure, dressed with antibiotic cream and bandaids.       ASSESSMENT/PLAN:     ASSESSMENT/PLAN:      ICD-10-CM    1. BV (bacterial vaginosis) N76.0 metroNIDAZOLE (FLAGYL) 500 MG tablet    B96.89    2. Retained tampon, initial encounter T19.2XXA    3. Screening for malignant neoplasm of cervix Z12.4 Pap imaged thin layer screen with HPV - recommended age 30 - 65 years (select HPV order below)   4. Skin tag L91.8 REMOVAL OF SKIN TAGS, FIRST 15   5. Essential hypertension I10 **Basic metabolic panel FUTURE 1yr   6. Vitamin D deficiency E55.9 **Vitamin D Deficiency FUTURE anytime   7. Screening for " lipoid disorders Z13.220 Lipid panel reflex to direct LDL Fasting   8. Vaginal discharge N89.8 Wet prep   9. Mild intermittent asthma without complication J45.20      She would like to come to the pharmacy for blood pressure checks    Patient Instructions   Metronidazole antibiotics for bacterial vaginosis  Follow up if symptoms worsen or not improving    SAD lamp 10,000 luxe.  Store in [facility] that sells them and has a showroom, or many graham online  1/2 hour each morning    You are due for lab tests  - future lab order is already placed in computer system  Fasting labs: no food or drink (except water) for 12 hours before labs, make lab appointment     Lily Fine PA-C  [facility]

## 2018-12-13 RX ORDER — LISINOPRIL 10 MG/1
10 TABLET ORAL DAILY
Qty: 90 TABLET | Refills: 1 | Status: SHIPPED | OUTPATIENT
Start: 2018-12-13 | End: 2019-11-25

## 2018-12-13 ASSESSMENT — ASTHMA QUESTIONNAIRES: ACT_TOTALSCORE: 25

## 2018-12-14 LAB
COPATH REPORT: NORMAL
PAP: NORMAL

## 2018-12-17 ENCOUNTER — MYC MEDICAL ADVICE (OUTPATIENT)
Dept: FAMILY MEDICINE | Facility: CLINIC | Age: 43
End: 2018-12-17

## 2018-12-17 LAB
FINAL DIAGNOSIS: NORMAL
HPV HR 12 DNA CVX QL NAA+PROBE: NEGATIVE
HPV16 DNA SPEC QL NAA+PROBE: NEGATIVE
HPV18 DNA SPEC QL NAA+PROBE: NEGATIVE
SPECIMEN DESCRIPTION: NORMAL
SPECIMEN SOURCE CVX/VAG CYTO: NORMAL

## 2019-01-15 ENCOUNTER — ALLIED HEALTH/NURSE VISIT (OUTPATIENT)
Dept: FAMILY MEDICINE | Facility: CLINIC | Age: 44
End: 2019-01-15
Payer: COMMERCIAL

## 2019-01-15 DIAGNOSIS — Z11.1 VISIT FOR MANTOUX TEST: Primary | ICD-10-CM

## 2019-01-15 PROCEDURE — 86580 TB INTRADERMAL TEST: CPT

## 2019-01-15 PROCEDURE — 99207 ZZC NO CHARGE NURSE ONLY: CPT

## 2019-01-18 ENCOUNTER — ALLIED HEALTH/NURSE VISIT (OUTPATIENT)
Dept: FAMILY MEDICINE | Facility: CLINIC | Age: 44
End: 2019-01-18
Payer: COMMERCIAL

## 2019-01-18 DIAGNOSIS — Z11.1 VISIT FOR MANTOUX TEST: Primary | ICD-10-CM

## 2019-01-18 LAB
PPDINDURATION: 0 MM (ref 0–5)
PPDREDNESS: 0 MM

## 2019-01-18 PROCEDURE — 99207 ZZC NO CHARGE NURSE ONLY: CPT

## 2019-01-18 NOTE — PROGRESS NOTES
Left Forearm.  Mantoux results: No induration.  No swelling.  No redness.  Patient states she does not need copy of Mantoux results.  Archana Randall RN

## 2019-03-05 ENCOUNTER — TELEPHONE (OUTPATIENT)
Dept: FAMILY MEDICINE | Facility: CLINIC | Age: 44
End: 2019-03-05

## 2019-03-05 NOTE — TELEPHONE ENCOUNTER
Message handled by Nurse Triage with [facility] - provider name: mei Zuniga Patient advised to continue drinking lots of water, keep appointment for tomorrow morning. If pain worsens and becomes severe; have someone take her to the ED.  Geoff Tim RN

## 2019-03-05 NOTE — TELEPHONE ENCOUNTER
"Emerald reports that she is experiencing intermittent pain; \"mainly lower torso on the left side; including back side, and front. Denies fever. Noticed symptoms starting a couple of days ago. Pain has worsened. Has been increasing fluid intake and cranberry juice. Intermittent pain from 2-6. Denies pain upon urination. Last BM was this morning. LMP was 2/14/19. Reports slight bloating feeling. Denies nausea and vomitting. At work in [place] now---about half hour away. Appointment made for tomorrow morning.   Geoff Tim RN    "

## 2019-03-05 NOTE — TELEPHONE ENCOUNTER
Reason for call:  Patient reporting a symptom    Symptom or request: Emerald calling and close to tears.  In a lot of pain thinking it could be kidney pain?  States that it's been going on for a few days.  Has been drinking lots of water and cranberry juice.  It recently was a dull pain, but today it's throbbing pain.  Please call and assess. Thank you..Syl Baum    Duration (how long have symptoms been present): a few days    Have you been treated for this before? No    Phone Number patient can be reached at:  Home number on file [phone] (home)    Best Time:  Any time    Can we leave a detailed message on this number:  YES    Call taken on 3/5/2019 at 10:44 AM by Syl Baum

## 2019-03-06 ENCOUNTER — ANCILLARY PROCEDURE (OUTPATIENT)
Dept: GENERAL RADIOLOGY | Facility: CLINIC | Age: 44
End: 2019-03-06
Attending: PHYSICIAN ASSISTANT
Payer: COMMERCIAL

## 2019-03-06 ENCOUNTER — OFFICE VISIT (OUTPATIENT)
Dept: FAMILY MEDICINE | Facility: CLINIC | Age: 44
End: 2019-03-06
Payer: COMMERCIAL

## 2019-03-06 VITALS
WEIGHT: 191.6 LBS | BODY MASS INDEX: 34.11 KG/M2 | SYSTOLIC BLOOD PRESSURE: 130 MMHG | HEART RATE: 98 BPM | TEMPERATURE: 97.5 F | DIASTOLIC BLOOD PRESSURE: 80 MMHG

## 2019-03-06 DIAGNOSIS — R10.13 ABDOMINAL PAIN, EPIGASTRIC: ICD-10-CM

## 2019-03-06 DIAGNOSIS — R10.13 ABDOMINAL PAIN, EPIGASTRIC: Primary | ICD-10-CM

## 2019-03-06 LAB
ALBUMIN SERPL-MCNC: 3.8 G/DL (ref 3.4–5)
ALBUMIN UR-MCNC: NEGATIVE MG/DL
ALP SERPL-CCNC: 82 U/L (ref 40–150)
ALT SERPL W P-5'-P-CCNC: 31 U/L (ref 0–50)
ANION GAP SERPL CALCULATED.3IONS-SCNC: 6 MMOL/L (ref 3–14)
APPEARANCE UR: CLEAR
AST SERPL W P-5'-P-CCNC: 20 U/L (ref 0–45)
BASOPHILS # BLD AUTO: 0 10E9/L (ref 0–0.2)
BASOPHILS NFR BLD AUTO: 0.2 %
BILIRUB SERPL-MCNC: 0.3 MG/DL (ref 0.2–1.3)
BILIRUB UR QL STRIP: NEGATIVE
BUN SERPL-MCNC: 8 MG/DL (ref 7–30)
CALCIUM SERPL-MCNC: 9.3 MG/DL (ref 8.5–10.1)
CHLORIDE SERPL-SCNC: 105 MMOL/L (ref 94–109)
CO2 SERPL-SCNC: 27 MMOL/L (ref 20–32)
COLOR UR AUTO: YELLOW
CREAT SERPL-MCNC: 0.88 MG/DL (ref 0.52–1.04)
DIFFERENTIAL METHOD BLD: NORMAL
EOSINOPHIL # BLD AUTO: 0.2 10E9/L (ref 0–0.7)
EOSINOPHIL NFR BLD AUTO: 2.4 %
ERYTHROCYTE [DISTWIDTH] IN BLOOD BY AUTOMATED COUNT: 13 % (ref 10–15)
GFR SERPL CREATININE-BSD FRML MDRD: 79 ML/MIN/{1.73_M2}
GLUCOSE SERPL-MCNC: 100 MG/DL (ref 70–99)
GLUCOSE UR STRIP-MCNC: NEGATIVE MG/DL
HCT VFR BLD AUTO: 42.4 % (ref 35–47)
HGB BLD-MCNC: 14 G/DL (ref 11.7–15.7)
HGB UR QL STRIP: NEGATIVE
KETONES UR STRIP-MCNC: NEGATIVE MG/DL
LEUKOCYTE ESTERASE UR QL STRIP: NEGATIVE
LIPASE SERPL-CCNC: 205 U/L (ref 73–393)
LYMPHOCYTES # BLD AUTO: 1.6 10E9/L (ref 0.8–5.3)
LYMPHOCYTES NFR BLD AUTO: 17.9 %
MCH RBC QN AUTO: 28.9 PG (ref 26.5–33)
MCHC RBC AUTO-ENTMCNC: 33 G/DL (ref 31.5–36.5)
MCV RBC AUTO: 88 FL (ref 78–100)
MONOCYTES # BLD AUTO: 0.5 10E9/L (ref 0–1.3)
MONOCYTES NFR BLD AUTO: 5.5 %
NEUTROPHILS # BLD AUTO: 6.6 10E9/L (ref 1.6–8.3)
NEUTROPHILS NFR BLD AUTO: 74 %
NITRATE UR QL: NEGATIVE
PH UR STRIP: 6 PH (ref 5–7)
PLATELET # BLD AUTO: 264 10E9/L (ref 150–450)
POTASSIUM SERPL-SCNC: 4.6 MMOL/L (ref 3.4–5.3)
PROT SERPL-MCNC: 7.5 G/DL (ref 6.8–8.8)
RBC # BLD AUTO: 4.84 10E12/L (ref 3.8–5.2)
SODIUM SERPL-SCNC: 138 MMOL/L (ref 133–144)
SOURCE: NORMAL
SP GR UR STRIP: 1.01 (ref 1–1.03)
UROBILINOGEN UR STRIP-ACNC: 0.2 EU/DL (ref 0.2–1)
WBC # BLD AUTO: 8.9 10E9/L (ref 4–11)

## 2019-03-06 PROCEDURE — 36415 COLL VENOUS BLD VENIPUNCTURE: CPT | Performed by: PHYSICIAN ASSISTANT

## 2019-03-06 PROCEDURE — 80053 COMPREHEN METABOLIC PANEL: CPT | Performed by: PHYSICIAN ASSISTANT

## 2019-03-06 PROCEDURE — 74019 RADEX ABDOMEN 2 VIEWS: CPT | Mod: FY

## 2019-03-06 PROCEDURE — 85025 COMPLETE CBC W/AUTO DIFF WBC: CPT | Performed by: PHYSICIAN ASSISTANT

## 2019-03-06 PROCEDURE — 81003 URINALYSIS AUTO W/O SCOPE: CPT | Performed by: PHYSICIAN ASSISTANT

## 2019-03-06 PROCEDURE — 99214 OFFICE O/P EST MOD 30 MIN: CPT | Performed by: PHYSICIAN ASSISTANT

## 2019-03-06 PROCEDURE — 93000 ELECTROCARDIOGRAM COMPLETE: CPT | Performed by: PHYSICIAN ASSISTANT

## 2019-03-06 PROCEDURE — 83690 ASSAY OF LIPASE: CPT | Performed by: PHYSICIAN ASSISTANT

## 2019-03-06 NOTE — PROGRESS NOTES
"  SUBJECTIVE:   Emerald Pearl is a 43 year old female who presents to clinic today for the following health issues:    Epigastric Abdominal Pain    From telephone note 3/5/19  Emerald reports that she is experiencing intermittent pain; \"mainly lower torso on the left side; including back side, and front. Denies fever. Noticed symptoms starting a couple of days ago. Pain has worsened. Has been increasing fluid intake and cranberry juice. Intermittent pain from 2-6. Denies pain upon urination. Last BM was this morning. LMP was 2/14/19. Reports slight bloating feeling. Denies nausea and vomitting. At work in [place] now---about half hour away. Appointment made for tomorrow morning.     Started as back pain then left back then went away.  Now more pain upper abdomen yesterday and today - after she ate  Shooting pain LUQ  No CP or SOB   Normal BMs daily/soft  No nausea/vomiting, but pressing on abdomen makes her feel more nauseous  Low appetite. Has still been eating and not worsening pain.  Pain varying in intensity but no known triggers. Right now 2-3/10  No heartburn but feels \"like a rock sitting there\"   Started Sunday. Glass of wine Monday  Not currently sexually active  A little ibu which helped a little  Gassy/bloating  Has had some heartburn in past. No black/tarry stools    No dysuria, urinary frequency or urgency  No fevers        Problem list and histories reviewed & adjusted, as indicated.  Additional history: as documented    Patient Active Problem List   Diagnosis     HTN (hypertension)     Scalp psoriasis     Mild intermittent asthma     Essential hypertension with goal blood pressure less than 140/90     Past Surgical History:   Procedure Laterality Date     NO HISTORY OF SURGERY         Social History     Tobacco Use     Smoking status: Never Smoker     Smokeless tobacco: Never Used   Substance Use Topics     Alcohol use: Yes     Alcohol/week: 0.0 oz     Comment: occ     Family History   Problem Relation " Age of Onset     Hypertension Mother      Lipids Mother      Blood Disease Father         leukemia     Hypertension Father      Lipids Father      Hypertension Paternal Grandfather      Cerebrovascular Disease Paternal Grandfather      Muscular Dystrophy Son         mild.  has too     Muscular Dystrophy Son         mild.  has too     Breast Cancer Maternal Aunt 60     Breast Cancer Other          Labs reviewed in EPIC    Reviewed and updated as needed this visit by clinical staff  Tobacco  Med Hx  Surg Hx  Soc Hx      Reviewed and updated as needed this visit by Provider  Tobacco  Med Hx  Surg Hx  Soc Hx        ROS:  Other than noted above, general, HEENT, respiratory, cardiac, MS, and gastrointestinal systems are negative.     OBJECTIVE:     /80   Pulse 98   Temp 97.5  F (36.4  C)   Wt 86.9 kg (191 lb 9.6 oz)   LMP 02/14/2019   BMI 34.11 kg/m    Body mass index is 34.11 kg/m .  GENERAL: healthy, alert and no distress  RESP: lungs clear to auscultation - no rales, rhonchi or wheezes  CV: regular rate and rhythm, normal S1 S2, no S3 or S4, no murmur, click or rub, no peripheral edema and peripheral pulses strong  ABDOMEN: soft, POSITIVE tender to palpate epigastric, no hepatosplenomegaly, no masses and bowel sounds normal  MS: no gross musculoskeletal defects noted, no edema  BACK: no CVA tenderness, no paralumbar tenderness    X-ray - IMPRESSION: Nonspecific bowel gas pattern. No evidence of bowel obstruction. No free intraperitoneal air.   Discussed with patient stool/gas  I independently viewed the x-ray, discussed with patient, and am awaiting radiology read.     EKG - appears normal, NSR, normal axis, normal intervals, no acute ST/T changes c/w ischemia, no LVH by voltage criteria, unchanged from previous tracings  I personally read, reviewed, and advised patient of EKG results.     CBC - within normal limits     ASSESSMENT/PLAN:     ASSESSMENT/PLAN:      ICD-10-CM    1. Abdominal  pain, epigastric R10.13 CBC with platelets differential     Comprehensive metabolic panel     XR Abdomen 2 Views     UA reflex to Microscopic and Culture     Lipase     EKG 12-lead complete w/read - Clinics       Patient Instructions   1. Can try OTC simethicone for gassiness. More fiber. More water.  2. Treat for gastritis: try zantac 150 mg two times daily and then as needed  Also start prilosec 20 mg 1/2 hour before food/other medications. Try this for 1 month  Follow up or call if symptoms are not improving/or if changing  Monitor symptoms - if worsening/severe please go to ER    Lily Fine PA-C  [facility]

## 2019-03-06 NOTE — LETTER
March 7, 2019      Emerald Pearl  [address] [place] [address]        Dear Emerald,    We are writing to inform you of your test results. Your test results fall within the expected range(s) or remain unchanged from previous results.  Please continue with current treatment plan.    Resulted Orders   CBC with platelets differential   Result Value Ref Range    WBC 8.9 4.0 - 11.0 10e9/L    RBC Count 4.84 3.8 - 5.2 10e12/L    Hemoglobin 14.0 11.7 - 15.7 g/dL    Hematocrit 42.4 35.0 - 47.0 %    MCV 88 78 - 100 fl    MCH 28.9 26.5 - 33.0 pg    MCHC 33.0 31.5 - 36.5 g/dL    RDW 13.0 10.0 - 15.0 %    Platelet Count 264 150 - 450 10e9/L    % Neutrophils 74.0 %    % Lymphocytes 17.9 %    % Monocytes 5.5 %    % Eosinophils 2.4 %    % Basophils 0.2 %    Absolute Neutrophil 6.6 1.6 - 8.3 10e9/L    Absolute Lymphocytes 1.6 0.8 - 5.3 10e9/L    Absolute Monocytes 0.5 0.0 - 1.3 10e9/L    Absolute Eosinophils 0.2 0.0 - 0.7 10e9/L    Absolute Basophils 0.0 0.0 - 0.2 10e9/L    Diff Method Automated Method    Comprehensive metabolic panel   Result Value Ref Range    Sodium 138 133 - 144 mmol/L    Potassium 4.6 3.4 - 5.3 mmol/L    Chloride 105 94 - 109 mmol/L    Carbon Dioxide 27 20 - 32 mmol/L    Anion Gap 6 3 - 14 mmol/L    Glucose 100 (H) 70 - 99 mg/dL      Comment:      Non Fasting    Urea Nitrogen 8 7 - 30 mg/dL    Creatinine 0.88 0.52 - 1.04 mg/dL    GFR Estimate 79 >60 mL/min/[1.73_m2]      Comment:      Non  GFR Calc  Starting 12/18/2018, serum creatinine based estimated GFR (eGFR) will be   calculated using the Chronic Kidney Disease Epidemiology Collaboration   (CKD-EPI) equation.      GFR Estimate If Black >90 >60 mL/min/[1.73_m2]      Comment:       GFR Calc  Starting 12/18/2018, serum creatinine based estimated GFR (eGFR) will be   calculated using the Chronic Kidney Disease Epidemiology Collaboration   (CKD-EPI) equation.      Calcium 9.3 8.5 - 10.1 mg/dL    Bilirubin Total 0.3 0.2 - 1.3  mg/dL    Albumin 3.8 3.4 - 5.0 g/dL    Protein Total 7.5 6.8 - 8.8 g/dL    Alkaline Phosphatase 82 40 - 150 U/L    ALT 31 0 - 50 U/L    AST 20 0 - 45 U/L   UA reflex to Microscopic and Culture   Result Value Ref Range    Color Urine Yellow     Appearance Urine Clear     Glucose Urine Negative NEG^Negative mg/dL    Bilirubin Urine Negative NEG^Negative    Ketones Urine Negative NEG^Negative mg/dL    Specific Gravity Urine 1.015 1.003 - 1.035    Blood Urine Negative NEG^Negative    pH Urine 6.0 5.0 - 7.0 pH    Protein Albumin Urine Negative NEG^Negative mg/dL    Urobilinogen Urine 0.2 0.2 - 1.0 EU/dL    Nitrite Urine Negative NEG^Negative    Leukocyte Esterase Urine Negative NEG^Negative    Source Midstream Urine    Lipase   Result Value Ref Range    Lipase 205 73 - 393 U/L   If you have any questions or concerns, please call the clinic at the number listed above.       Sincerely,        Lily Fine PA-C

## 2019-03-06 NOTE — PATIENT INSTRUCTIONS
1. Can try OTC simethicone for gassiness. More fiber. More water.  2. Treat for gastritis: try zantac 150 mg two times daily and then as needed  Also start prilosec 20 mg 1/2 hour before food/other medications. Try this for 1 month  Follow up or call if symptoms are not improving/or if changing  Monitor symptoms - if worsening/severe please go to ER

## 2019-05-05 DIAGNOSIS — J30.2 CHRONIC SEASONAL ALLERGIC RHINITIS: ICD-10-CM

## 2019-05-06 RX ORDER — FLUTICASONE PROPIONATE 50 MCG
SPRAY, SUSPENSION (ML) NASAL
Qty: 48 G | Refills: 3 | Status: SHIPPED | OUTPATIENT
Start: 2019-05-06 | End: 2020-04-28

## 2019-05-06 NOTE — TELEPHONE ENCOUNTER
Prescription approved per [facility] Refill Protocol.  Geoff Tim RN

## 2019-05-06 NOTE — TELEPHONE ENCOUNTER
"FLUTICASONE PROP 50 MCG SPRAY      Last Written Prescription Date:  12/6/17  Last Fill Quantity: 1,   # refills: 11  Last Office Visit: 3/6/19  Future Office visit:       Requested Prescriptions   Pending Prescriptions Disp Refills     fluticasone (FLONASE) 50 MCG/ACT nasal spray [Pharmacy Med Name: FLUTICASONE PROP 50 MCG SPRAY] 16 mL 8     Sig: SPRAY 1-2 SPRAYS INTO BOTH NOSTRILS DAILY (NEEDS FOLLOW-UP APPOINTMENT FOR THIS MEDICATION)       Inhaled Steroids Protocol Passed - 5/5/2019 10:39 AM        Passed - Patient is age 12 or older        Passed - Asthma control assessment score within normal limits in last 6 months     Please review ACT score.           Passed - Medication is active on med list        Passed - Recent (6 mo) or future (30 days) visit within the authorizing provider's specialty     Patient had office visit in the last 6 months or has a visit in the next 30 days with authorizing provider or within the authorizing provider's specialty.  See \"Patient Info\" tab in inbasket, or \"Choose Columns\" in Meds & Orders section of the refill encounter.              "

## 2019-11-25 DIAGNOSIS — I10 ESSENTIAL HYPERTENSION WITH GOAL BLOOD PRESSURE LESS THAN 140/90: ICD-10-CM

## 2019-11-25 NOTE — TELEPHONE ENCOUNTER
"LISINOPRIL 10 MG TABLET      Last Written Prescription Date:  12/13/18  Last Fill Quantity: 90,   # refills: 1  Last Office Visit: 3/6/19  Future Office visit:       Requested Prescriptions   Pending Prescriptions Disp Refills     lisinopril (PRINIVIL/ZESTRIL) 10 MG tablet [Pharmacy Med Name: LISINOPRIL 10 MG TABLET] 90 tablet 1     Sig: TAKE 1 TABLET BY MOUTH EVERY DAY       ACE Inhibitors (Including Combos) Protocol Passed - 11/25/2019  2:42 AM        Passed - Blood pressure under 140/90 in past 12 months     BP Readings from Last 3 Encounters:   03/06/19 130/80   12/12/18 132/86   12/29/17 128/86                 Passed - Recent (12 mo) or future (30 days) visit within the authorizing provider's specialty     Patient has had an office visit with the authorizing provider or a provider within the authorizing providers department within the previous 12 mos or has a future within next 30 days. See \"Patient Info\" tab in inbasket, or \"Choose Columns\" in Meds & Orders section of the refill encounter.              Passed - Medication is active on med list        Passed - Patient is age 18 or older        Passed - No active pregnancy on record        Passed - Normal serum creatinine on file in past 12 months     Recent Labs   Lab Test 03/06/19  1153   CR 0.88             Passed - Normal serum potassium on file in past 12 months     Recent Labs   Lab Test 03/06/19  1153   POTASSIUM 4.6             Passed - No positive pregnancy test within past 12 months          "

## 2019-11-26 RX ORDER — LISINOPRIL 10 MG/1
TABLET ORAL
Qty: 90 TABLET | Refills: 1 | Status: SHIPPED | OUTPATIENT
Start: 2019-11-26 | End: 2020-03-10

## 2019-12-18 ENCOUNTER — OFFICE VISIT (OUTPATIENT)
Dept: FAMILY MEDICINE | Facility: CLINIC | Age: 44
End: 2019-12-18
Payer: COMMERCIAL

## 2019-12-18 VITALS
TEMPERATURE: 98.2 F | DIASTOLIC BLOOD PRESSURE: 89 MMHG | WEIGHT: 189 LBS | BODY MASS INDEX: 33.49 KG/M2 | SYSTOLIC BLOOD PRESSURE: 139 MMHG | HEART RATE: 108 BPM | HEIGHT: 63 IN | OXYGEN SATURATION: 100 %

## 2019-12-18 DIAGNOSIS — J45.20 MILD INTERMITTENT ASTHMA WITHOUT COMPLICATION: ICD-10-CM

## 2019-12-18 DIAGNOSIS — R06.02 SHORTNESS OF BREATH: Primary | ICD-10-CM

## 2019-12-18 PROCEDURE — 99213 OFFICE O/P EST LOW 20 MIN: CPT | Performed by: FAMILY MEDICINE

## 2019-12-18 RX ORDER — PREDNISONE 50 MG/1
50 TABLET ORAL DAILY
Qty: 5 TABLET | Refills: 0 | Status: SHIPPED | OUTPATIENT
Start: 2019-12-18 | End: 2020-09-04

## 2019-12-18 ASSESSMENT — ENCOUNTER SYMPTOMS
DIARRHEA: 0
DIZZINESS: 1
COUGH: 1
HEMATOCHEZIA: 0
WHEEZING: 1
EYE PAIN: 0
HEADACHES: 0
DYSURIA: 0
CONSTIPATION: 0
SORE THROAT: 0
JOINT SWELLING: 0
CHILLS: 0
NAUSEA: 0
SINUS PRESSURE: 0
BREAST MASS: 0
PALPITATIONS: 0
HEMATURIA: 0
NERVOUS/ANXIOUS: 0
FEVER: 0
MYALGIAS: 0
WEAKNESS: 0
SHORTNESS OF BREATH: 1
PARESTHESIAS: 0
SINUS PAIN: 0
ARTHRALGIAS: 0
HEARTBURN: 0
ABDOMINAL PAIN: 0
FREQUENCY: 0

## 2019-12-18 ASSESSMENT — MIFFLIN-ST. JEOR: SCORE: 1463.89

## 2019-12-18 NOTE — PROGRESS NOTES
Subjective     Emerald Pearl is a 44 year old female who presents to clinic today for the following health issues:    HPI            Symptoms: cc Present Absent Comment   Fever/Chills   x    Fatigue  x     Headache  x     Muscle or Body  Aches   x    Eye Irritation   x    Sneezing   x    Nasal Gene/Drg  x  Mild and clear   Sinus Pressure/Pain  x  Mild sinus   Dental pain   x    Sore Throat   x    Swollen Glands   x    Ear Pain/Fullness  x  Popping at times.    Cough x      Wheeze  x     Chest Discomfort  x     Shortness of breath  x     Abdominal pain   x    Emesis    x    Diarrhea   x    Other x   Dizziness for the last few days.   nose bleeds daily the past 1.5 weeks.      Symptom duration:  sick for about a month, seen in UC around [facility], told sinus infection. Never took antibiotics because she got better. Cough has gotten worse, hard to take deep breath.    Symptom severity:     Treatments tried:  increased use of inhaler, allegra, Flonase,    Contacts:  no     Patient Active Problem List   Diagnosis     HTN (hypertension)     Scalp psoriasis     Mild intermittent asthma     Essential hypertension with goal blood pressure less than 140/90     Past Surgical History:   Procedure Laterality Date     NO HISTORY OF SURGERY         Social History     Tobacco Use     Smoking status: Never Smoker     Smokeless tobacco: Never Used   Substance Use Topics     Alcohol use: Yes     Alcohol/week: 0.0 standard drinks     Comment: occ     Family History   Problem Relation Age of Onset     Hypertension Mother      Lipids Mother      Blood Disease Father         leukemia     Hypertension Father      Lipids Father      Hypertension Paternal Grandfather      Cerebrovascular Disease Paternal Grandfather      Muscular Dystrophy Son         mild.  has too     Muscular Dystrophy Son         mild.  has too     Breast Cancer Maternal Aunt 60     Breast Cancer Other          Current Outpatient Medications   Medication  Sig Dispense Refill     albuterol (PROAIR HFA/PROVENTIL HFA/VENTOLIN HFA) 108 (90 BASE) MCG/ACT Inhaler Inhale 2 puffs krishna lungs q 6 hrs for SOB/wheezing overdue for office appt with  Pankratz plz make appt now before further refills Sept 2017 1 Inhaler 0     fexofenadine (ALLEGRA) 180 MG tablet Take 1 tablet (180 mg) by mouth daily 30 tablet 1     fluocinonide (LIDEX) 0.05 % solution Apply topically daily 60 mL 1     fluticasone (FLONASE) 50 MCG/ACT nasal spray SPRAY 1-2 SPRAYS INTO BOTH NOSTRILS DAILY (NEEDS FOLLOW-UP APPOINTMENT FOR THIS MEDICATION) 48 g 3     lisinopril (PRINIVIL/ZESTRIL) 10 MG tablet TAKE 1 TABLET BY MOUTH EVERY DAY 90 tablet 1     Olopatadine HCl 0.7 % SOLN Apply 1 drop to eye daily 25 mL 1     OMEPRAZOLE PO Take by mouth daily       No Known Allergies    1. URI: Patient was not feeling well the day before Thanksgiving but did not take antibiotics (was diagnosed with sinus infection with amoxicillin).  Patient has shortness of breath, dry cough, ear pressure bilaterally, nose bleed, and dizziness.  No fevers or chills.  History of asthma.  Patient is using the inhaler 2-3 times per month.  States that the sinus pressure is improved.  Getting nose bleed daily, patient attributes this to the dryness.  Patient recently traveled.  Appetite varies and energy is low.     Review of Systems   Constitutional: Negative for chills and fever.   HENT: Positive for nosebleeds. Negative for congestion, ear pain, hearing loss, sinus pressure, sinus pain and sore throat.         Ear pressure   Eyes: Negative for pain and visual disturbance.   Respiratory: Positive for cough, shortness of breath and wheezing.    Cardiovascular: Negative for chest pain, palpitations and peripheral edema.   Gastrointestinal: Negative for abdominal pain, constipation, diarrhea, heartburn, hematochezia and nausea.   Breasts:  Negative for tenderness, breast mass and discharge.   Genitourinary: Negative for dysuria, frequency,  "genital sores, hematuria, pelvic pain, urgency, vaginal bleeding and vaginal discharge.   Musculoskeletal: Negative for arthralgias, joint swelling and myalgias.   Skin: Negative for rash.   Neurological: Positive for dizziness. Negative for weakness, headaches and paresthesias.   Psychiatric/Behavioral: Negative for mood changes. The patient is not nervous/anxious.        Objective    /89   Pulse 108   Temp 98.2  F (36.8  C) (Tympanic)   Ht 1.596 m (5' 2.84\")   Wt 85.7 kg (189 lb)   SpO2 100%   BMI 33.65 kg/m    Body mass index is 33.65 kg/m .  Physical Exam  Constitutional:       General: She is not in acute distress.  HENT:      Head: Normocephalic and atraumatic.      Right Ear: Tympanic membrane normal.      Left Ear: Tympanic membrane normal.      Nose: Nose normal.      Mouth/Throat:      Pharynx: No oropharyngeal exudate or posterior oropharyngeal erythema.   Eyes:      Conjunctiva/sclera: Conjunctivae normal.   Neck:      Musculoskeletal: Normal range of motion.      Trachea: No tracheal deviation.   Cardiovascular:      Rate and Rhythm: Normal rate and regular rhythm.      Heart sounds: Normal heart sounds.   Pulmonary:      Effort: Pulmonary effort is normal. No respiratory distress.      Breath sounds: Normal breath sounds. No wheezing or rales.   Skin:     Findings: No rash.   Neurological:      Mental Status: She is alert.          Assessment & Plan     1. Shortness of breath  Due to poorly controlled asthma?  Does not appear infectious in etiology. If no improvement, consider CXR.  - predniSONE (DELTASONE) 50 MG tablet; Take 1 tablet (50 mg) by mouth daily  Dispense: 5 tablet; Refill: 0    2. Mild intermittent asthma without complication  Continue with albuterol.     See Patient Instructions    Return in about 1 week (around 12/25/2019), or if symptoms worsen or fail to improve.    Melchor Larkin, DO  [facility]      "

## 2019-12-18 NOTE — PATIENT INSTRUCTIONS
Katy Corbin,    Thank you for allowing [facility] to manage your care.    I sent your prescriptions to your pharmacy.    Encourage warm humidifier for you cough symptoms.    If you have any questions or concerns, please feel free to call us at [phone].    [facility] to you and your family!    Sincerely,    Dr. Larkin    Did you know?  You can schedule an e-Visit for certain simple non-emergent issue for your convenience.  To learn more about or start an eVisit, simply login to [employer] website, click  Visits  on top banner, click  Start a Virtual Visit  drop down, and click  Symptom-Specific E-Visit

## 2019-12-19 ASSESSMENT — ASTHMA QUESTIONNAIRES: ACT_TOTALSCORE: 25

## 2020-01-17 ENCOUNTER — OFFICE VISIT (OUTPATIENT)
Dept: FAMILY MEDICINE | Facility: CLINIC | Age: 45
End: 2020-01-17
Payer: COMMERCIAL

## 2020-01-17 ENCOUNTER — ANCILLARY PROCEDURE (OUTPATIENT)
Dept: GENERAL RADIOLOGY | Facility: CLINIC | Age: 45
End: 2020-01-17
Attending: NURSE PRACTITIONER
Payer: COMMERCIAL

## 2020-01-17 VITALS
DIASTOLIC BLOOD PRESSURE: 68 MMHG | HEIGHT: 63 IN | HEART RATE: 106 BPM | OXYGEN SATURATION: 100 % | TEMPERATURE: 98 F | WEIGHT: 186 LBS | SYSTOLIC BLOOD PRESSURE: 124 MMHG | BODY MASS INDEX: 32.96 KG/M2

## 2020-01-17 DIAGNOSIS — R06.02 SHORTNESS OF BREATH: ICD-10-CM

## 2020-01-17 DIAGNOSIS — J45.41 MODERATE PERSISTENT ASTHMA WITH ACUTE EXACERBATION: ICD-10-CM

## 2020-01-17 DIAGNOSIS — J01.41 ACUTE RECURRENT PANSINUSITIS: Primary | ICD-10-CM

## 2020-01-17 PROCEDURE — 71046 X-RAY EXAM CHEST 2 VIEWS: CPT | Mod: FY

## 2020-01-17 PROCEDURE — 99213 OFFICE O/P EST LOW 20 MIN: CPT | Performed by: NURSE PRACTITIONER

## 2020-01-17 RX ORDER — BENZONATATE 100 MG/1
100-200 CAPSULE ORAL
COMMUNITY
Start: 2020-01-12 | End: 2020-01-22

## 2020-01-17 RX ORDER — AZITHROMYCIN 250 MG/1
TABLET, FILM COATED ORAL
COMMUNITY
Start: 2020-01-12 | End: 2020-09-04

## 2020-01-17 ASSESSMENT — MIFFLIN-ST. JEOR: SCORE: 1450.28

## 2020-01-17 ASSESSMENT — PAIN SCALES - GENERAL: PAINLEVEL: NO PAIN (0)

## 2020-01-17 NOTE — PATIENT INSTRUCTIONS
1.  Take antibiotic as directed.  2.  Use albuterol inhaler as needed.  3.  Start Breo as directed.  Wash out mouth after to reduce chance of thrush.  4.  Follow-up in 1 week if symptoms are not improving.  5.  If any worsening symptoms in breathing follow-up sooner.      Sinusitis (Antibiotic Treatment)    The sinuses are air-filled spaces within the bones of the face. They connect to the inside of the nose. Sinusitis is an inflammation of the tissue that lines the sinuses. Sinusitis can occur during a cold. It can also happen due to allergies to pollens and other particles in the air. Sinusitis can cause symptoms of sinus congestion and a feeling of fullness. A sinus infection causes fever, headache, and facial pain. There is often green or yellow fluid draining from the nose or into the back of the throat (post-nasal drip). You have been given antibiotics to treat this condition.  Home care    Take the full course of antibiotics as instructed. Do not stop taking them, even when you feel better.    Drink plenty of water, hot tea, and other liquids. This may help thin nasal mucus. It also may help your sinuses drain fluids.    Heat may help soothe painful areas of your face. Use a towel soaked in hot water. Or,  the shower and direct the warm spray onto your face. Using a vaporizer along with a menthol rub at night may also help soothe symptoms.     An expectorant with guaifenesin may help thin nasal mucus and help your sinuses drain fluids.    You can use an over-the-counter decongestant, unless a similar medicine was prescribed to you. Nasal sprays work the fastest. Use one that contains phenylephrine or oxymetazoline. First blow your nose gently. Then use the spray. Do not use these medicines more often than directed on the label. If you do, your symptoms may get worse. You may also take pills that contain pseudoephedrine. Don t use products that combine multiple medicines. This is because side effects  may be increased. Read labels. You can also ask the pharmacist for help. (People with high blood pressure should not use decongestants. They can raise blood pressure.)    Over-the-counter antihistamines may help if allergies contributed to your sinusitis.      Do not use nasal rinses or irrigation during an acute sinus infection, unless your healthcare provider tells you to. Rinsing may spread the infection to other areas in your sinuses.    Use acetaminophen or ibuprofen to control pain, unless another pain medicine was prescribed to you. If you have chronic liver or kidney disease or ever had a stomach ulcer, talk with your healthcare provider before using these medicines. (Aspirin should never be taken by anyone under age 18 who is ill with a fever. It may cause severe liver damage.)    Don't smoke. This can make symptoms worse.  Follow-up care  Follow up with your healthcare provider or our staff if you are not better in 1 week.  When to seek medical advice  Call your healthcare provider if any of these occur:    Facial pain or headache that gets worse    Stiff neck    Unusual drowsiness or confusion    Swelling of your forehead or eyelids    Vision problems, such as blurred or double vision    Fever of 100.4 F (38 C) or higher, or as directed by your healthcare provider    Seizure    Breathing problems    Symptoms don't go away in 10 days  Prevention  Here are steps you can take to help prevent an infection:    Keep good hand washing habits.    Don t have close contact with people who have sore throats, colds, or other upper respiratory infections.    Don t smoke, and stay away from secondhand smoke.    Stay up to date with of your vaccines.  Date Last Reviewed: 11/1/2017 2000-2019 [employer]. [address] [place] [address], [place] [address]. All rights reserved. This information is not intended as a substitute for professional medical care. Always follow your healthcare professional's  instructions.           Patient Education     Sinusitis (Antibiotic Treatment)    The sinuses are air-filled spaces within the bones of the face. They connect to the inside of the nose. Sinusitis is an inflammation of the tissue that lines the sinuses. Sinusitis can occur during a cold. It can also happen due to allergies to pollens and other particles in the air. Sinusitis can cause symptoms of sinus congestion and a feeling of fullness. A sinus infection causes fever, headache, and facial pain. There is often green or yellow fluid draining from the nose or into the back of the throat (post-nasal drip). You have been given antibiotics to treat this condition.  Home care    Take the full course of antibiotics as instructed. Do not stop taking them, even when you feel better.    Drink plenty of water, hot tea, and other liquids. This may help thin nasal mucus. It also may help your sinuses drain fluids.    Heat may help soothe painful areas of your face. Use a towel soaked in hot water. Or,  the shower and direct the warm spray onto your face. Using a vaporizer along with a menthol rub at night may also help soothe symptoms.     An expectorant with guaifenesin may help thin nasal mucus and help your sinuses drain fluids.    You can use an over-the-counter decongestant, unless a similar medicine was prescribed to you. Nasal sprays work the fastest. Use one that contains phenylephrine or oxymetazoline. First blow your nose gently. Then use the spray. Do not use these medicines more often than directed on the label. If you do, your symptoms may get worse. You may also take pills that contain pseudoephedrine. Don t use products that combine multiple medicines. This is because side effects may be increased. Read labels. You can also ask the pharmacist for help. (People with high blood pressure should not use decongestants. They can raise blood pressure.)    Over-the-counter antihistamines may help if allergies  contributed to your sinusitis.      Do not use nasal rinses or irrigation during an acute sinus infection, unless your healthcare provider tells you to. Rinsing may spread the infection to other areas in your sinuses.    Use acetaminophen or ibuprofen to control pain, unless another pain medicine was prescribed to you. If you have chronic liver or kidney disease or ever had a stomach ulcer, talk with your healthcare provider before using these medicines. (Aspirin should never be taken by anyone under age 18 who is ill with a fever. It may cause severe liver damage.)    Don't smoke. This can make symptoms worse.  Follow-up care  Follow up with your healthcare provider or our staff if you are not better in 1 week.  When to seek medical advice  Call your healthcare provider if any of these occur:    Facial pain or headache that gets worse    Stiff neck    Unusual drowsiness or confusion    Swelling of your forehead or eyelids    Vision problems, such as blurred or double vision    Fever of 100.4 F (38 C) or higher, or as directed by your healthcare provider    Seizure    Breathing problems    Symptoms don't go away in 10 days  Prevention  Here are steps you can take to help prevent an infection:    Keep good hand washing habits.    Don t have close contact with people who have sore throats, colds, or other upper respiratory infections.    Don t smoke, and stay away from secondhand smoke.    Stay up to date with of your vaccines.  Date Last Reviewed: 11/1/2017 2000-2019 [employer]. [address] [place] [address]. All rights reserved. This information is not intended as a substitute for professional medical care. Always follow your healthcare professional's instructions.

## 2020-01-17 NOTE — PROGRESS NOTES
Subjective     Emerald Pearl is a 44 year old female who presents to clinic today for the following health issues:    HPI   ED/UC Followup:    Facility:  [facility]  Date of visit: 1/12/20  Reason for visit: Sinus symptoms   Current Status: Had prednisone, Zithromax and tessalon pearls. She is still not doing better. Has been ongoing since November 2019.      Sinus pressure back with vengeance and wheezy and tired of being unable to breathe.  In December albuterol helped.  Now it does not helped.  Ears hurt yesterday like there was pressure.  Congestion is still bad with lots of things out of the nose.  Patient complains of the sinus pressure throughout her whole entire face.  She was recently put on Z-Milind and prednisone with no improvement in the last 5 days.    Patient Active Problem List   Diagnosis     HTN (hypertension)     Scalp psoriasis     Mild intermittent asthma     Essential hypertension with goal blood pressure less than 140/90     Past Surgical History:   Procedure Laterality Date     NO HISTORY OF SURGERY         Social History     Tobacco Use     Smoking status: Never Smoker     Smokeless tobacco: Never Used   Substance Use Topics     Alcohol use: Yes     Alcohol/week: 0.0 standard drinks     Comment: occ     Family History   Problem Relation Age of Onset     Hypertension Mother      Lipids Mother      Blood Disease Father         leukemia     Hypertension Father      Lipids Father      Hypertension Paternal Grandfather      Cerebrovascular Disease Paternal Grandfather      Muscular Dystrophy Son         mild.  has too     Muscular Dystrophy Son         mild.  has too     Breast Cancer Maternal Aunt 60     Breast Cancer Other          Current Outpatient Medications   Medication Sig Dispense Refill     albuterol (PROAIR HFA/PROVENTIL HFA/VENTOLIN HFA) 108 (90 BASE) MCG/ACT Inhaler Inhale 2 puffs krishna lungs q 6 hrs for SOB/wheezing overdue for office appt with  Babatunde  "plz make appt now before further refills Sept 2017 1 Inhaler 0     amoxicillin-clavulanate (AUGMENTIN) 875-125 MG tablet Take 1 tablet by mouth 2 times daily for 7 days 14 tablet 0     benzonatate (TESSALON) 100 MG capsule Take 100-200 mg by mouth       fexofenadine (ALLEGRA) 180 MG tablet Take 1 tablet (180 mg) by mouth daily 30 tablet 1     fluticasone-vilanterol (BREO ELLIPTA) 100-25 MCG/INH inhaler Inhale 1 puff into the lungs daily 1 Inhaler 3     lisinopril (PRINIVIL/ZESTRIL) 10 MG tablet TAKE 1 TABLET BY MOUTH EVERY DAY 90 tablet 1     azithromycin (ZITHROMAX) 250 MG tablet        fluocinonide (LIDEX) 0.05 % solution Apply topically daily (Patient not taking: Reported on 1/17/2020) 60 mL 1     fluticasone (FLONASE) 50 MCG/ACT nasal spray SPRAY 1-2 SPRAYS INTO BOTH NOSTRILS DAILY (NEEDS FOLLOW-UP APPOINTMENT FOR THIS MEDICATION) (Patient not taking: Reported on 1/17/2020) 48 g 3     Olopatadine HCl 0.7 % SOLN Apply 1 drop to eye daily (Patient not taking: Reported on 1/17/2020) 25 mL 1     OMEPRAZOLE PO Take by mouth daily       predniSONE (DELTASONE) 50 MG tablet Take 1 tablet (50 mg) by mouth daily 5 tablet 0     No Known Allergies    Reviewed and updated as needed this visit by Provider  Tobacco  Allergies  Meds  Problems  Med Hx  Surg Hx  Fam Hx         Review of Systems   ROS COMP: CONSTITUTIONAL: NEGATIVE for fever, chills, change in weight  ENT/MOUTH: POSITIVE for nasal congestion, postnasal drainage and sinus pressure  RESP:POSITIVE for cough-non productive, Hx asthma, SOB/dyspnea and wheezing  CV: NEGATIVE for chest pain, palpitations or peripheral edema  PSYCHIATRIC: NEGATIVE for changes in mood or affect  ROS otherwise negative      Objective    /68   Pulse 106   Temp 98  F (36.7  C) (Tympanic)   Ht 1.596 m (5' 2.84\")   Wt 84.4 kg (186 lb)   SpO2 100%   BMI 33.12 kg/m    Body mass index is 33.12 kg/m .  Physical Exam   GENERAL: healthy, alert and no distress  HENT: ear canals and " TM's normal, nose and mouth without ulcers or lesions  NECK: no adenopathy and no asymmetry, masses, or scars  RESP: lungs clear to auscultation - no rales, rhonchi or wheezes  CV: regular rate and rhythm, normal S1 S2, no S3 or S4, no murmur, click or rub, no peripheral edema and peripheral pulses strong  PSYCH: mentation appears normal, affect normal/bright    Diagnostic Test Results:  CXR -awaiting radiology report        Assessment & Plan     1. Acute recurrent pansinusitis  Patient has symptoms of sinusitis with tenderness on palpation of the sinuses and moderate to large amounts of nasal drainage. Zpak has not seemed to be improving symptoms.  Will start Augmentin twice daily for 7 days for treatment.  Information given on symptom management.  Specific recommendation for Mucinex or Robitussin with the expectorant.  Follow-up in 1 week if no improvement or sooner if worsening symptoms.  - amoxicillin-clavulanate (AUGMENTIN) 875-125 MG tablet; Take 1 tablet by mouth 2 times daily for 7 days  Dispense: 14 tablet; Refill: 0    2. Moderate persistent asthma with acute exacerbation  Most likely asthma exacerbation.  Patient is not taking the prednisone since she does not feel is helpful.  Will continue albuterol and start Breo once daily and she can take 2 puffs once daily if this is not helpful.  Follow-up in 1 week if any persistent symptoms or sooner if worsening breathing issues.  - fluticasone-vilanterol (BREO ELLIPTA) 100-25 MCG/INH inhaler; Inhale 1 puff into the lungs daily  Dispense: 1 Inhaler; Refill: 3    3. Shortness of breath  - XR Chest 2 Views; Future    See Patient Instructions    Return in about 1 week (around 1/24/2020), or if symptoms worsen or fail to improve.    Tarah Rosas, KATRIN  [facility] ([facility]

## 2020-02-10 ENCOUNTER — TELEPHONE (OUTPATIENT)
Dept: FAMILY MEDICINE | Facility: CLINIC | Age: 45
End: 2020-02-10

## 2020-03-02 ENCOUNTER — HEALTH MAINTENANCE LETTER (OUTPATIENT)
Age: 45
End: 2020-03-02

## 2020-03-10 DIAGNOSIS — I10 ESSENTIAL HYPERTENSION WITH GOAL BLOOD PRESSURE LESS THAN 140/90: ICD-10-CM

## 2020-03-10 RX ORDER — LISINOPRIL 10 MG/1
10 TABLET ORAL DAILY
Qty: 90 TABLET | Refills: 0 | Status: SHIPPED | OUTPATIENT
Start: 2020-03-10 | End: 2020-08-18

## 2020-03-10 NOTE — TELEPHONE ENCOUNTER
Emerald called and stated that she is in [place] and forgot her medication.  She was hoping to get a refill of her lisinopril.  Please review and order if appropriate.  Thank you..Syl Baum    lisinopril      Last Written Prescription Date:  11/26/19  Last Fill Quantity: 90,   # refills: 1  Last Office Visit: 1/17/20  Future Office visit:       Routing refill request to provider for review/approval because:  Forgot medication @ home.  Is on vacation.

## 2020-04-28 DIAGNOSIS — J30.2 CHRONIC SEASONAL ALLERGIC RHINITIS: ICD-10-CM

## 2020-04-28 RX ORDER — FLUTICASONE PROPIONATE 50 MCG
SPRAY, SUSPENSION (ML) NASAL
Qty: 48 ML | Refills: 1 | Status: SHIPPED | OUTPATIENT
Start: 2020-04-28 | End: 2020-09-04

## 2020-08-15 DIAGNOSIS — I10 ESSENTIAL HYPERTENSION WITH GOAL BLOOD PRESSURE LESS THAN 140/90: ICD-10-CM

## 2020-08-18 DIAGNOSIS — I10 ESSENTIAL HYPERTENSION WITH GOAL BLOOD PRESSURE LESS THAN 140/90: ICD-10-CM

## 2020-08-18 RX ORDER — LISINOPRIL 10 MG/1
TABLET ORAL
Qty: 30 TABLET | Refills: 0 | OUTPATIENT
Start: 2020-08-18

## 2020-08-18 RX ORDER — LISINOPRIL 10 MG/1
TABLET ORAL
Qty: 30 TABLET | Refills: 0 | Status: SHIPPED | OUTPATIENT
Start: 2020-08-18 | End: 2020-09-04

## 2020-08-18 NOTE — TELEPHONE ENCOUNTER
lisinopril (ZESTRIL) 10 MG tablet  30 tablet  0  8/18/2020   No    Sig: TAKE 1 TABLET BY MOUTH EVERY DAY    Sent to pharmacy as: Lisinopril 10 MG Oral Tablet (ZESTRIL)    Class: E-Prescribe    Order: [id number]    E-Prescribing Status: Receipt confirmed by pharmacy (8/18/2020 11:30 AM CDT)    Printout Tracking     External Result Report    Pharmacy     [id number] - [place] [id number]

## 2020-08-18 NOTE — TELEPHONE ENCOUNTER
"Requested Prescriptions   Pending Prescriptions Disp Refills     lisinopril (ZESTRIL) 10 MG tablet [Pharmacy Med Name: LISINOPRIL 10 MG TABLET] 90 tablet 0     Sig: TAKE 1 TABLET BY MOUTH EVERY DAY       ACE Inhibitors (Including Combos) Protocol Failed - 8/15/2020 12:25 AM        Failed - Normal serum creatinine on file in past 12 months     Recent Labs   Lab Test 03/06/19  1153   CR 0.88       Ok to refill medication if creatinine is low          Failed - Normal serum potassium on file in past 12 months     Recent Labs   Lab Test 03/06/19  1153   POTASSIUM 4.6             Passed - Blood pressure under 140/90 in past 12 months     BP Readings from Last 3 Encounters:   01/17/20 124/68   12/18/19 139/89   03/06/19 130/80                 Passed - Recent (12 mo) or future (30 days) visit within the authorizing provider's specialty     Patient has had an office visit with the authorizing provider or a provider within the authorizing providers department within the previous 12 mos or has a future within next 30 days. See \"Patient Info\" tab in inbasket, or \"Choose Columns\" in Meds & Orders section of the refill encounter.              Passed - Medication is active on med list        Passed - Patient is age 18 or older        Passed - No active pregnancy on record        Passed - No positive pregnancy test within past 12 months             "

## 2020-09-04 ENCOUNTER — OFFICE VISIT (OUTPATIENT)
Dept: FAMILY MEDICINE | Facility: CLINIC | Age: 45
End: 2020-09-04
Payer: COMMERCIAL

## 2020-09-04 VITALS
SYSTOLIC BLOOD PRESSURE: 124 MMHG | BODY MASS INDEX: 33.66 KG/M2 | DIASTOLIC BLOOD PRESSURE: 76 MMHG | HEART RATE: 78 BPM | HEIGHT: 63 IN | WEIGHT: 190 LBS | TEMPERATURE: 98.1 F

## 2020-09-04 DIAGNOSIS — Z12.31 VISIT FOR SCREENING MAMMOGRAM: ICD-10-CM

## 2020-09-04 DIAGNOSIS — L40.9 SCALP PSORIASIS: ICD-10-CM

## 2020-09-04 DIAGNOSIS — E55.9 VITAMIN D DEFICIENCY: ICD-10-CM

## 2020-09-04 DIAGNOSIS — J30.2 CHRONIC SEASONAL ALLERGIC RHINITIS: ICD-10-CM

## 2020-09-04 DIAGNOSIS — I10 ESSENTIAL HYPERTENSION: ICD-10-CM

## 2020-09-04 DIAGNOSIS — Z13.220 SCREENING FOR LIPOID DISORDERS: ICD-10-CM

## 2020-09-04 DIAGNOSIS — J45.20 MILD INTERMITTENT ASTHMA WITHOUT COMPLICATION: ICD-10-CM

## 2020-09-04 DIAGNOSIS — Z00.00 ROUTINE GENERAL MEDICAL EXAMINATION AT A HEALTH CARE FACILITY: Primary | ICD-10-CM

## 2020-09-04 DIAGNOSIS — I10 ESSENTIAL HYPERTENSION WITH GOAL BLOOD PRESSURE LESS THAN 140/90: ICD-10-CM

## 2020-09-04 DIAGNOSIS — M79.621 TENDERNESS OF RIGHT AXILLA: ICD-10-CM

## 2020-09-04 LAB
ANION GAP SERPL CALCULATED.3IONS-SCNC: 4 MMOL/L (ref 3–14)
BUN SERPL-MCNC: 14 MG/DL (ref 7–30)
CALCIUM SERPL-MCNC: 9.1 MG/DL (ref 8.5–10.1)
CHLORIDE SERPL-SCNC: 105 MMOL/L (ref 94–109)
CHOLEST SERPL-MCNC: 196 MG/DL
CO2 SERPL-SCNC: 28 MMOL/L (ref 20–32)
CREAT SERPL-MCNC: 0.9 MG/DL (ref 0.52–1.04)
GFR SERPL CREATININE-BSD FRML MDRD: 75 ML/MIN/{1.73_M2}
GLUCOSE SERPL-MCNC: 69 MG/DL (ref 70–99)
HDLC SERPL-MCNC: 67 MG/DL
LDLC SERPL CALC-MCNC: 68 MG/DL
NONHDLC SERPL-MCNC: 129 MG/DL
POTASSIUM SERPL-SCNC: 3.8 MMOL/L (ref 3.4–5.3)
SODIUM SERPL-SCNC: 137 MMOL/L (ref 133–144)
TRIGL SERPL-MCNC: 305 MG/DL

## 2020-09-04 PROCEDURE — 80048 BASIC METABOLIC PNL TOTAL CA: CPT | Performed by: PHYSICIAN ASSISTANT

## 2020-09-04 PROCEDURE — 82306 VITAMIN D 25 HYDROXY: CPT | Performed by: PHYSICIAN ASSISTANT

## 2020-09-04 PROCEDURE — 36415 COLL VENOUS BLD VENIPUNCTURE: CPT | Performed by: PHYSICIAN ASSISTANT

## 2020-09-04 PROCEDURE — 90471 IMMUNIZATION ADMIN: CPT | Performed by: PHYSICIAN ASSISTANT

## 2020-09-04 PROCEDURE — 90714 TD VACC NO PRESV 7 YRS+ IM: CPT | Performed by: PHYSICIAN ASSISTANT

## 2020-09-04 PROCEDURE — 80061 LIPID PANEL: CPT | Performed by: PHYSICIAN ASSISTANT

## 2020-09-04 PROCEDURE — 99396 PREV VISIT EST AGE 40-64: CPT | Mod: 25 | Performed by: PHYSICIAN ASSISTANT

## 2020-09-04 RX ORDER — FLUOCINONIDE TOPICAL SOLUTION USP, 0.05% 0.5 MG/ML
SOLUTION TOPICAL DAILY
Qty: 60 ML | Refills: 1 | Status: SHIPPED | OUTPATIENT
Start: 2020-09-04 | End: 2021-12-22

## 2020-09-04 RX ORDER — LISINOPRIL 10 MG/1
10 TABLET ORAL DAILY
Qty: 90 TABLET | Refills: 3 | Status: SHIPPED | OUTPATIENT
Start: 2020-09-04 | End: 2021-08-24

## 2020-09-04 RX ORDER — FLUTICASONE PROPIONATE 50 MCG
SPRAY, SUSPENSION (ML) NASAL
Qty: 48 ML | Refills: 3 | Status: SHIPPED | OUTPATIENT
Start: 2020-09-04 | End: 2021-11-01

## 2020-09-04 ASSESSMENT — MIFFLIN-ST. JEOR: SCORE: 1463.42

## 2020-09-04 ASSESSMENT — PAIN SCALES - GENERAL: PAINLEVEL: NO PAIN (0)

## 2020-09-04 NOTE — PATIENT INSTRUCTIONS
Mammogram/ultrasound: For [facility] ([place], [place], [facility]) imaging departments: call [phone] to schedule       Preventive Health Recommendations  Female Ages 40 to 49    Yearly exam:     See your health care provider every year in order to  1. Review health changes.   2. Discuss preventive care.    3. Review your medicines if your doctor prescribed any.      Get a Pap test every three years (unless you have an abnormal result and your provider advises testing more often).      If you get Pap tests with HPV test, you only need to test every 5 years, unless you have an abnormal result. You do not need a Pap test if your uterus was removed (hysterectomy) and you have not had cancer.      You should be tested each year for STDs (sexually transmitted diseases), if you're at risk.     Ask your doctor if you should have a mammogram.      Have a colonoscopy (test for colon cancer) if someone in your family has had colon cancer or polyps before age 50.       Have a cholesterol test every 5 years.       Have a diabetes test (fasting glucose) after age 45. If you are at risk for diabetes, you should have this test every 3 years.    Shots: Get a flu shot each year. Get a tetanus shot every 10 years.     Nutrition:     Eat at least 5 servings of fruits and vegetables each day.    Eat whole-grain bread, whole-wheat pasta and brown rice instead of white grains and rice.    Get adequate Calcium and Vitamin D.      Lifestyle    Exercise at least 150 minutes a week (an average of 30 minutes a day, 5 days a week). This will help you control your weight and prevent disease.    Limit alcohol to one drink per day.    No smoking.     Wear sunscreen to prevent skin cancer.    See your dentist every six months for an exam and cleaning.

## 2020-09-04 NOTE — LETTER
My Asthma Action Plan    Name: Emerald Pearl   YOB: 1975  Date: 9/4/2020   My doctor: Lily Fine PA-C   My clinic: [facility]        My Rescue Medicine:   Albuterol inhaler (Proair/Ventolin/Proventil HFA)  2-4 puffs EVERY 4 HOURS as needed. Use a spacer if recommended by your provider.   My Asthma Severity:   Intermittent / Exercise Induced  Know your asthma triggers: upper respiratory infections             GREEN ZONE   Good Control    I feel good    No cough or wheeze    Can work, sleep and play without asthma symptoms       Take your asthma control medicine every day.     1. If exercise triggers your asthma, take your rescue medication    15 minutes before exercise or sports, and    During exercise if you have asthma symptoms  2. Spacer to use with inhaler: If you have a spacer, make sure to use it with your inhaler             YELLOW ZONE Getting Worse  I have ANY of these:    I do not feel good    Cough or wheeze    Chest feels tight    Wake up at night   1. Keep taking your Green Zone medications  2. Start taking your rescue medicine:    every 20 minutes for up to 1 hour. Then every 4 hours for 24-48 hours.  3. If you stay in the Yellow Zone for more than 12-24 hours, contact your doctor.  4. If you do not return to the Green Zone in 12-24 hours or you get worse, start taking your oral steroid medicine if prescribed by your provider.           RED ZONE Medical Alert - Get Help  I have ANY of these:    I feel awful    Medicine is not helping    Breathing getting harder    Trouble walking or talking    Nose opens wide to breathe       1. Take your rescue medicine NOW  2. If your provider has prescribed an oral steroid medicine, start taking it NOW  3. Call your doctor NOW  4. If you are still in the Red Zone after 20 minutes and you have not reached your doctor:    Take your rescue medicine again and    Call 911 or go to the emergency room right away    See your regular doctor  within 2 weeks of an Emergency Room or Urgent Care visit for follow-up treatment.          Annual Reminders:  Meet with Asthma Educator,  Flu Shot in the Fall, consider Pneumonia Vaccination for patients with asthma (aged 19 and older).    Pharmacy:    [id number] - [place], [place] - [address]  [id number] - [place] - [address] AT [facility]  [id number] [address] [place] [address] - [address]  [id number] - [place], [place] - [address] AT AT [facility] [address]    Electronically signed by Lily Fine PA-C   Date: 09/04/20                    Asthma Triggers  How To Control Things That Make Your Asthma Worse    Triggers are things that make your asthma worse.  Look at the list below to help you find your triggers and   what you can do about them. You can help prevent asthma flare-ups by staying away from your triggers.      Trigger                                                          What you can do   Cigarette Smoke  Tobacco smoke can make asthma worse. Do not allow smoking in your home, car or around you.  Be sure no one smokes at a child s day care or school.  If you smoke, ask your health care provider for ways to help you quit.  Ask family members to quit too.  Ask your health care provider for a referral to Quit Plan to help you quit smoking, or call 0-658-493-PLAN.     Colds, Flu, Bronchitis  These are common triggers of asthma. Wash your hands often.  Don t touch your eyes, nose or mouth.  Get a flu shot every year.     Dust Mites  These are tiny bugs that live in cloth or carpet. They are too small to see. Wash sheets and blankets in hot water every week.   Encase pillows and mattress in dust mite proof covers.  Avoid having carpet if you can. If you have carpet, vacuum weekly.   Use a dust mask and HEPA vacuum.   Pollen and Outdoor Mold  Some people are allergic to trees, grass, or  weed pollen, or molds. Try to keep your windows closed.  Limit time out doors when pollen count is high.   Ask you health care provider about taking medicine during allergy season.     Animal Dander  Some people are allergic to skin flakes, urine or saliva from pets with fur or feathers. Keep pets with fur or feathers out of your home.    If you can t keep the pet outdoors, then keep the pet out of your bedroom.  Keep the bedroom door closed.  Keep pets off cloth furniture and away from stuffed toys.     Mice, Rats, and Cockroaches  Some people are allergic to the waste from these pests.   Cover food and garbage.  Clean up spills and food crumbs.  Store grease in the refrigerator.   Keep food out of the bedroom.   Indoor Mold  This can be a trigger if your home has high moisture. Fix leaking faucets, pipes, or other sources of water.   Clean moldy surfaces.  Dehumidify basement if it is damp and smelly.   Smoke, Strong Odors, and Sprays  These can reduce air quality. Stay away from strong odors and sprays, such as perfume, powder, hair spray, paints, smoke incense, paint, cleaning products, candles and new carpet.   Exercise or Sports  Some people with asthma have this trigger. Be active!  Ask your doctor about taking medicine before sports or exercise to prevent symptoms.    Warm up for 5-10 minutes before and after sports or exercise.     Other Triggers of Asthma  Cold air:  Cover your nose and mouth with a scarf.  Sometimes laughing or crying can be a trigger.  Some medicines and food can trigger asthma.

## 2020-09-04 NOTE — LETTER
September 10, 2020      Emerald Pearl  [address] [place] [address]        Dear ,    We are writing to inform you of your test results.    Here are your cholesterol results: Your LDL (bad cholesterol) is 68 (normal is less than 130).  Your HDL (good cholesterol) is 67 (normal for women is 50 or greater, normal for men is 40 or greater).  Your triglycerides (fats in the blood) are 305 (normal is less than 150).   Some ways to lower your bad cholesterol and raise your good cholesterol include eating a diet that is lower in animal fats and higher in fruits, vegetables, and fiber, and getting 30 minutes of aerobic exercise most days of the week.   In general, Mediterranean style diet is recommended.     Vitamin D is in a good range, okay to continue the dosing you are taking.     Glucose is just slightly low - this likely has to do with the timing of your eating vs lab draw.   Other labs are normal/stable   Let me know if you have questions or concerns.     Resulted Orders   Lipid panel reflex to direct LDL Fasting   Result Value Ref Range    Cholesterol 196 <200 mg/dL    Triglycerides 305 (H) <150 mg/dL      Comment:      Borderline high:  150-199 mg/dl  High:             200-499 mg/dl  Very high:       >499 mg/dl      HDL Cholesterol 67 >49 mg/dL    LDL Cholesterol Calculated 68 <100 mg/dL      Comment:      Desirable:       <100 mg/dl    Non HDL Cholesterol 129 <130 mg/dL   Basic metabolic panel  (Ca, Cl, CO2, Creat, Gluc, K, Na, BUN)   Result Value Ref Range    Sodium 137 133 - 144 mmol/L    Potassium 3.8 3.4 - 5.3 mmol/L    Chloride 105 94 - 109 mmol/L    Carbon Dioxide 28 20 - 32 mmol/L    Anion Gap 4 3 - 14 mmol/L    Glucose 69 (L) 70 - 99 mg/dL    Urea Nitrogen 14 7 - 30 mg/dL    Creatinine 0.90 0.52 - 1.04 mg/dL    GFR Estimate 75 >60 mL/min/[1.73_m2]      Comment:      Non  GFR Calc  Starting 12/18/2018, serum creatinine based estimated GFR (eGFR) will be   calculated using the  Chronic Kidney Disease Epidemiology Collaboration   (CKD-EPI) equation.      GFR Estimate If Black 87 >60 mL/min/[1.73_m2]      Comment:       GFR Calc  Starting 12/18/2018, serum creatinine based estimated GFR (eGFR) will be   calculated using the Chronic Kidney Disease Epidemiology Collaboration   (CKD-EPI) equation.      Calcium 9.1 8.5 - 10.1 mg/dL   Vitamin D Deficiency   Result Value Ref Range    Vitamin D Deficiency screening 48 20 - 75 ug/L      Comment:      Season, race, dietary intake, and treatment affect the concentration of   25-hydroxy-Vitamin D. Values may decrease during winter months and increase   during summer months. Values 20-29 ug/L may indicate Vitamin D insufficiency   and values <20 ug/L may indicate Vitamin D deficiency.  Vitamin D determination is routinely performed by an immunoassay specific for   25 hydroxyvitamin D3.  If an individual is on vitamin D2 (ergocalciferol)   supplementation, please specify 25 OH vitamin D2 and D3 level determination by   LCMSMS test [id number].         If you have any questions or concerns, please call the clinic at the number listed above.       Sincerely,        Lily Fine PA-C

## 2020-09-04 NOTE — PROGRESS NOTES
SUBJECTIVE:   CC: Emerald Pearl is an 45 year old woman who presents for preventive health visit.     Healthy Habits:    Do you get at least three servings of calcium containing foods daily (dairy, green leafy vegetables, etc.)? yes    Amount of exercise or daily activities, outside of work: 3-5 day(s) per week    Problems taking medications regularly No    Medication side effects: No    Have you had an eye exam in the past two years? no    Do you see a dentist twice per year? yes    Do you have sleep apnea, excessive snoring or daytime drowsiness?no      -Having some soreness in the right armpit area when putting deodorant on.    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 Yes        Social History     Tobacco Use     Smoking status: Never Smoker     Smokeless tobacco: Never Used   Substance Use Topics     Alcohol use: Yes     Alcohol/week: 0.0 standard drinks     Comment: occ     If you drink alcohol do you typically have >3 drinks per day or >7 drinks per week? No                     Reviewed orders with patient.  Reviewed health maintenance and updated orders accordingly - Yes  Lab work is in process  Labs reviewed in EPIC  BP Readings from Last 3 Encounters:   09/04/20 124/76   01/17/20 124/68   12/18/19 139/89    Wt Readings from Last 3 Encounters:   09/04/20 86.2 kg (190 lb)   01/17/20 84.4 kg (186 lb)   12/18/19 85.7 kg (189 lb)                  Patient Active Problem List   Diagnosis     HTN (hypertension)     Scalp  psoriasis     Mild intermittent asthma     Essential hypertension with goal blood pressure less than 140/90     Past Surgical History:   Procedure Laterality Date     NO HISTORY OF SURGERY         Social History     Tobacco Use     Smoking status: Never Smoker     Smokeless tobacco: Never Used   Substance Use Topics     Alcohol use: Yes     Alcohol/week: 0.0 standard drinks     Comment: occ     Family History   Problem Relation Age of Onset     Hypertension Mother      Lipids Mother      Blood Disease Father         leukemia     Hypertension Father      Lipids Father      Hypertension Paternal Grandfather      Cerebrovascular Disease Paternal Grandfather      Muscular Dystrophy Son         mild.  has too     Muscular Dystrophy Son         mild.  has too     Breast Cancer Maternal Aunt 60     Breast Cancer Other            Mammogram Screening: Patient under age 50, mutual decision reflected in health maintenance.      Pertinent mammograms are reviewed under the imaging tab.  History of abnormal Pap smear: NO - age 30-65 PAP every 5 years with negative HPV co-testing recommended  PAP / HPV Latest Ref Rng & Units 12/12/2018 10/26/2015   PAP - NIL NIL   HPV 16 DNA NEG:Negative Negative Negative   HPV 18 DNA NEG:Negative Negative Negative   OTHER HR HPV NEG:Negative Negative Negative     Reviewed and updated as needed this visit by clinical staff  Tobacco  Allergies  Meds  Med Hx  Surg Hx  Fam Hx  Soc Hx        Reviewed and updated as needed this visit by Provider        Past Medical History:   Diagnosis Date     Asthma      Hypertension      Seasonal allergies       Past Surgical History:   Procedure Laterality Date     NO HISTORY OF SURGERY         ROS:  CONSTITUTIONAL: NEGATIVE for fever, chills, change in weight  INTEGUMENTARU/SKIN: NEGATIVE for worrisome rashes, moles or lesions  EYES: NEGATIVE for vision changes or irritation  ENT: NEGATIVE for ear, mouth and throat problems  RESP: NEGATIVE for  "significant cough or SOB  BREAST: NEGATIVE for masses, tenderness or discharge  CV: NEGATIVE for chest pain, palpitations or peripheral edema  GI: NEGATIVE for nausea, abdominal pain, heartburn, or change in bowel habits  : NEGATIVE for unusual urinary or vaginal symptoms. Periods are regular.  MUSCULOSKELETAL: NEGATIVE for significant arthralgias or myalgia  NEURO: NEGATIVE for weakness, dizziness or paresthesias  PSYCHIATRIC: NEGATIVE for changes in mood or affect    OBJECTIVE:   /74   Pulse 78   Temp 98.1  F (36.7  C) (Tympanic)   Ht 1.596 m (5' 2.84\")   Wt 86.2 kg (190 lb)   BMI 33.83 kg/m    EXAM:  GENERAL: healthy, alert and no distress  EYES: Eyes grossly normal to inspection, PERRL and conjunctivae and sclerae normal  HENT: ear canals and TM's normal, nose and mouth without ulcers or lesions  NECK: no adenopathy, no asymmetry, masses, or scars and thyroid normal to palpation  RESP: lungs clear to auscultation - no rales, rhonchi or wheezes  BREAST: normal without masses, tenderness or nipple discharge and no palpable axillary masses or adenopathy  BREAST: axillary findings: POSITIVE no masses palpated. Mild tenderness generalized outer right axilla  CV: regular rate and rhythm, normal S1 S2, no S3 or S4, no murmur, click or rub, no peripheral edema and peripheral pulses strong  ABDOMEN: soft, nontender, no hepatosplenomegaly, no masses and bowel sounds normal   (female): deferred by patient   MS: no gross musculoskeletal defects noted, no edema  SKIN: no suspicious lesions or rashes  NEURO: Normal strength and tone, mentation intact and speech normal  BACK: no CVA tenderness, no paralumbar tenderness  PSYCH: mentation appears normal, affect normal/bright  LYMPH: no cervical, supraclavicular, axillary, or inguinal adenopathy    Diagnostic Test Results:  Labs reviewed in Epic    ASSESSMENT/PLAN:       ICD-10-CM    1. Routine general medical examination at a health care facility  Z00.00    2. " "Visit for screening mammogram  Z12.31 US Breast Right Complete 4 Quadrants     MA Diagnostic Digital Bilateral   3. Screening for lipoid disorders  Z13.220 Lipid panel reflex to direct LDL Fasting   4. Vitamin D deficiency  E55.9 Vitamin D Deficiency   5. Essential hypertension  I10 Basic metabolic panel  (Ca, Cl, CO2, Creat, Gluc, K, Na, BUN)   6. Essential hypertension with goal blood pressure less than 140/90  I10 lisinopril (ZESTRIL) 10 MG tablet   7. Mild intermittent asthma without complication  J45.20    8. Scalp psoriasis  L40.9 fluocinonide (LIDEX) 0.05 % external solution   9. Chronic seasonal allergic rhinitis  J30.2 fluticasone (FLONASE) 50 MCG/ACT nasal spray   10. Tenderness of right axilla  M79.621 US Breast Right Complete 4 Quadrants     MA Diagnostic Digital Bilateral     Axillary tenderness likely musculoskeletal with water aerobics however no injury. We will though obtain mammo/ultrasound to rule out pathology/adenopathy  Refilled meds, doing well/.    COUNSELING:   Reviewed preventive health counseling, as reflected in patient instructions       Regular exercise       Healthy diet/nutrition       HIV screeninx in teen years, 1x in adult years, and at intervals if high risk    Estimated body mass index is 33.83 kg/m  as calculated from the following:    Height as of this encounter: 1.596 m (5' 2.84\").    Weight as of this encounter: 86.2 kg (190 lb).    Weight management plan: Discussed healthy diet and exercise guidelines    She reports that she has never smoked. She has never used smokeless tobacco.      Counseling Resources:  ATP IV Guidelines  Pooled Cohorts Equation Calculator  Breast Cancer Risk Calculator  BRCA-Related Cancer Risk Assessment: FHS-7 Tool  FRAX Risk Assessment  ICSI Preventive Guidelines  Dietary Guidelines for Americans, 2010  USDA's MyPlate  ASA Prophylaxis  Lung CA Screening    Lily Fnie PA-C  [facility]  "

## 2020-09-05 ASSESSMENT — ASTHMA QUESTIONNAIRES: ACT_TOTALSCORE: 25

## 2020-09-08 LAB — DEPRECATED CALCIDIOL+CALCIFEROL SERPL-MC: 48 UG/L (ref 20–75)

## 2020-09-30 ENCOUNTER — HOSPITAL ENCOUNTER (OUTPATIENT)
Dept: ULTRASOUND IMAGING | Facility: CLINIC | Age: 45
End: 2020-09-30
Attending: PHYSICIAN ASSISTANT
Payer: COMMERCIAL

## 2020-09-30 ENCOUNTER — HOSPITAL ENCOUNTER (OUTPATIENT)
Dept: MAMMOGRAPHY | Facility: CLINIC | Age: 45
End: 2020-09-30
Attending: PHYSICIAN ASSISTANT
Payer: COMMERCIAL

## 2020-09-30 DIAGNOSIS — M79.621 TENDERNESS OF RIGHT AXILLA: ICD-10-CM

## 2020-09-30 DIAGNOSIS — Z12.31 VISIT FOR SCREENING MAMMOGRAM: ICD-10-CM

## 2020-09-30 PROCEDURE — 76642 ULTRASOUND BREAST LIMITED: CPT | Mod: 50

## 2020-09-30 PROCEDURE — G0279 TOMOSYNTHESIS, MAMMO: HCPCS

## 2020-10-26 ENCOUNTER — OFFICE VISIT (OUTPATIENT)
Dept: FAMILY MEDICINE | Facility: CLINIC | Age: 45
End: 2020-10-26
Payer: COMMERCIAL

## 2020-10-26 VITALS
RESPIRATION RATE: 16 BRPM | TEMPERATURE: 98.6 F | WEIGHT: 194.2 LBS | HEART RATE: 101 BPM | BODY MASS INDEX: 35.74 KG/M2 | HEIGHT: 62 IN | DIASTOLIC BLOOD PRESSURE: 70 MMHG | SYSTOLIC BLOOD PRESSURE: 108 MMHG | OXYGEN SATURATION: 98 %

## 2020-10-26 DIAGNOSIS — R21 RASH AND NONSPECIFIC SKIN ERUPTION: Primary | ICD-10-CM

## 2020-10-26 PROCEDURE — 99213 OFFICE O/P EST LOW 20 MIN: CPT | Performed by: PHYSICIAN ASSISTANT

## 2020-10-26 RX ORDER — PREDNISONE 20 MG/1
TABLET ORAL
Qty: 20 TABLET | Refills: 0 | Status: SHIPPED | OUTPATIENT
Start: 2020-10-26 | End: 2020-11-09

## 2020-10-26 ASSESSMENT — MIFFLIN-ST. JEOR: SCORE: 1469.14

## 2020-10-26 NOTE — PROGRESS NOTES
"Subjective     Emerald Pearl is a 45 year old female who presents to clinic today for the following health issues:    HPI         Rash  Onset/Duration: x 1 week  Description  Location: hands, neck, face chest and stomach  Character: round, blotchy, raised, red, itchy  Itching: severe  Intensity:  severe  Progression of Symptoms:  worsening  Accompanying signs and symptoms:   Fever: no  Body aches or joint pain: no  Sore throat symptoms: no  Recent cold symptoms: no  History:           Previous episodes of similar rash: None  New exposures:  None  Recent travel: no  Exposure to similar rash: no  Precipitating or alleviating factors: nothing making it worse or better  Therapies tried and outcome: hydrocortisone cream -  Effective brief and Allegra/fexofenadine -  not effective    Popping up on her hands, arms, upper chest, face, and new today is on the back of her neck  No new soaps, shampoos, lotions, etc..  States she has sensitive skin so is always careful about what she uses and has not changed anything  No change in medications  Nothing below the waist that she has noticed  Has allergies so takes allergy medications year around  Itching is keeping her up at night      Review of Systems   Remainder of ROS obtained and found to be negative other than that which was documented above        Objective    /70   Pulse 101   Temp 98.6  F (37  C) (Tympanic)   Resp 16   Ht 1.575 m (5' 2\")   Wt 88.1 kg (194 lb 3.2 oz)   SpO2 98%   BMI 35.52 kg/m    Body mass index is 35.52 kg/m .  Physical Exam   GENERAL: healthy, alert and no distress  SKIN: slightly raised more urticarial like lesions on the back of the neck and a few fingers. Diffuse, not raised red rash noted on upper chest          Assessment & Plan     (R21) Rash and nonspecific skin eruption  (primary encounter diagnosis)  Comment: unsure what the precipitating factor was or is. Discussed that it is ipossible she only came in to contact with something " "one time and now it is a matter of getting everything to \"calm down\" Already on allergy medications. Will try a course of prednisone - discussed potential side effects  Plan: predniSONE (DELTASONE) 20 MG tablet              Return in about 2 weeks (around 11/9/2020) for If not improving or worsening.    Adina Ruiz PA-C  [facility]    "

## 2020-11-09 ENCOUNTER — OFFICE VISIT (OUTPATIENT)
Dept: FAMILY MEDICINE | Facility: CLINIC | Age: 45
End: 2020-11-09
Payer: COMMERCIAL

## 2020-11-09 VITALS
HEART RATE: 90 BPM | WEIGHT: 194 LBS | SYSTOLIC BLOOD PRESSURE: 120 MMHG | BODY MASS INDEX: 35.7 KG/M2 | OXYGEN SATURATION: 97 % | TEMPERATURE: 97.8 F | HEIGHT: 62 IN | DIASTOLIC BLOOD PRESSURE: 86 MMHG

## 2020-11-09 DIAGNOSIS — R21 RASH: Primary | ICD-10-CM

## 2020-11-09 PROCEDURE — 99213 OFFICE O/P EST LOW 20 MIN: CPT | Performed by: NURSE PRACTITIONER

## 2020-11-09 RX ORDER — PREDNISONE 20 MG/1
TABLET ORAL
Qty: 20 TABLET | Refills: 0 | Status: SHIPPED | OUTPATIENT
Start: 2020-11-09 | End: 2021-12-22

## 2020-11-09 ASSESSMENT — MIFFLIN-ST. JEOR: SCORE: 1468.23

## 2020-11-09 NOTE — PROGRESS NOTES
"Subjective     Emerald Pearl is a 45 year old female who presents to clinic today for the following health issues:    HPI         Rash  Recheck rash from 10/26/20  Onset/Duration: 3 weeks  Description  Location: back, neck , hands and face  Got better with the prednisone, but one day after stopping, rash is back just as bad  Character: blotchy, red, hot and itcy- kind of like a sunburn  Itching: severe  Intensity:  severe  Progression of Symptoms:  worsening  Accompanying signs and symptoms:   Fever: no  Body aches or joint pain: no  Sore throat symptoms: no  Recent cold symptoms: no  History:           Previous episodes of similar rash: None  New exposures:  None  Recent travel: no  Exposure to similar rash: no  Precipitating or alleviating factors: unknown  Therapies tried and outcome: hydrocortisone cream -  Temporary/brief and Allegra/fexofenadine -  not effective  Also given prednisone two weeks ago.  Benadryl- helped her sleep        Review of Systems   Constitutional, HEENT, cardiovascular, pulmonary, gi and gu systems are negative, except as otherwise noted.      Objective    /86 (BP Location: Right arm)   Pulse 90   Temp 97.8  F (36.6  C) (Tympanic)   Ht 1.575 m (5' 2\")   Wt 88 kg (194 lb)   SpO2 97%   Breastfeeding No   BMI 35.48 kg/m    Body mass index is 35.48 kg/m .  Physical Exam   GENERAL: healthy, alert and no distress  SKIN: erythematous skin and papules, rash obscured by scratch marks - covering back, chest and arms.            Assessment & Plan     Rash  Etiology unclear.  No new exposures.  Prednisone cleared the rash, but it return when medication was completed.  Will repeat steroids x1 and have her see dermatology.    - predniSONE (DELTASONE) 20 MG tablet; Take 3 tabs by mouth daily x 3 days, then 2 tabs daily x 3 days, then 1 tab daily x 3 days, then 1/2 tab daily x 3 days.  - DERMATOLOGY ADULT REFERRAL; Future            Return in about 4 weeks (around 12/7/2020).    The risks, " benefits and treatment options of prescribed medications or other treatments have been discussed with the patient. The patient verbalized their understanding and should call or follow up if no improvement or if they develop further problems.    VALENTE Paige [facility]

## 2020-11-11 ENCOUNTER — OFFICE VISIT (OUTPATIENT)
Dept: DERMATOLOGY | Facility: CLINIC | Age: 45
End: 2020-11-11
Attending: NURSE PRACTITIONER
Payer: COMMERCIAL

## 2020-11-11 VITALS — SYSTOLIC BLOOD PRESSURE: 145 MMHG | RESPIRATION RATE: 20 BRPM | HEART RATE: 119 BPM | DIASTOLIC BLOOD PRESSURE: 81 MMHG

## 2020-11-11 DIAGNOSIS — L50.3 DERMATOGRAPHIA: Primary | ICD-10-CM

## 2020-11-11 DIAGNOSIS — L30.9 DERMATITIS: ICD-10-CM

## 2020-11-11 DIAGNOSIS — R21 RASH: ICD-10-CM

## 2020-11-11 PROCEDURE — 99243 OFF/OP CNSLTJ NEW/EST LOW 30: CPT | Performed by: DERMATOLOGY

## 2020-11-11 RX ORDER — FLUOCINONIDE 0.5 MG/G
CREAM TOPICAL 2 TIMES DAILY
Qty: 120 G | Refills: 3 | Status: SHIPPED | OUTPATIENT
Start: 2020-11-11 | End: 2021-12-22

## 2020-11-11 NOTE — PROGRESS NOTES
Chief Complaint   Patient presents with     Rash       Vitals:    11/11/20 1319   BP: (!) 145/81   BP Location: Left arm   Patient Position: Sitting   Cuff Size: Adult Regular   Pulse: 119   Resp: 20     Wt Readings from Last 1 Encounters:   11/09/20 88 kg (194 lb)       Hi LIND RN   Specialty Clinics

## 2020-11-11 NOTE — PROGRESS NOTES
Emerald Pearl , a 45 year old year old female patient, I was asked to see by SUZIE Delacruz , for rash.  Patient states this has been present for months.  Patient reports the following symptoms:  itching .  Patient reports the following previous treatments prednisone helped some.  .  Patient reports the following modifying factors none.  Associated symptoms: none.  Patient has no other skin complaints today.  Remainder of the HPI, Meds, PMH, Allergies, FH, and SH was reviewed in chart.      Past Medical History:   Diagnosis Date     Asthma      Hypertension      Seasonal allergies        Past Surgical History:   Procedure Laterality Date     NO HISTORY OF SURGERY          Family History   Problem Relation Age of Onset     Hypertension Mother      Lipids Mother      Blood Disease Father         leukemia     Hypertension Father      Lipids Father      Hypertension Paternal Grandfather      Cerebrovascular Disease Paternal Grandfather      Muscular Dystrophy Son         mild.  has too     Muscular Dystrophy Son         mild.  has too     Breast Cancer Maternal Aunt 60     Breast Cancer Other        Social History     Socioeconomic History     Marital status:      Spouse name: Not on file     Number of children: Not on file     Years of education: Not on file     Highest education level: Not on file   Occupational History     Not on file   Social Needs     Financial resource strain: Not on file     Food insecurity     Worry: Not on file     Inability: Not on file     Transportation needs     Medical: Not on file     Non-medical: Not on file   Tobacco Use     Smoking status: Never Smoker     Smokeless tobacco: Never Used   Substance and Sexual Activity     Alcohol use: Yes     Alcohol/week: 0.0 standard drinks     Comment: occ     Drug use: No     Sexual activity: Not on file   Lifestyle     Physical activity     Days per week: Not on file     Minutes per session: Not on file     Stress: Not on file    Relationships     Social connections     Talks on phone: Not on file     Gets together: Not on file     Attends Restoration service: Not on file     Active member of club or organization: Not on file     Attends meetings of clubs or organizations: Not on file     Relationship status: Not on file     Intimate partner violence     Fear of current or ex partner: Not on file     Emotionally abused: Not on file     Physically abused: Not on file     Forced sexual activity: Not on file   Other Topics Concern     Parent/sibling w/ CABG, MI or angioplasty before 65F 55M? Not Asked   Social History Narrative     Not on file       Outpatient Encounter Medications as of 11/11/2020   Medication Sig Dispense Refill     albuterol (PROAIR HFA/PROVENTIL HFA/VENTOLIN HFA) 108 (90 BASE) MCG/ACT Inhaler Inhale 2 puffs krishna lungs q 6 hrs for SOB/wheezing overdue for office appt with  Pankratz plz make appt now before further refills Sept 2017 1 Inhaler 0     fexofenadine (ALLEGRA) 180 MG tablet Take 1 tablet (180 mg) by mouth daily 30 tablet 1     fluocinonide (LIDEX) 0.05 % external solution Apply topically daily 60 mL 1     fluticasone (FLONASE) 50 MCG/ACT nasal spray SPRAY 1-2 SPRAYS INTO BOTH NOSTRILS DAILY 48 mL 3     lisinopril (ZESTRIL) 10 MG tablet Take 1 tablet (10 mg) by mouth daily 90 tablet 3     OMEPRAZOLE PO Take by mouth daily Uses with prednisone due to heartburn       predniSONE (DELTASONE) 20 MG tablet Take 3 tabs by mouth daily x 3 days, then 2 tabs daily x 3 days, then 1 tab daily x 3 days, then 1/2 tab daily x 3 days. 20 tablet 0     Olopatadine HCl 0.7 % SOLN Apply 1 drop to eye daily (Patient not taking: Reported on 10/26/2020) 25 mL 1     No facility-administered encounter medications on file as of 11/11/2020.              Review Of Systems  Skin: As above  Eyes: negative  Ears/Nose/Throat: negative  Respiratory: No shortness of breath, dyspnea on exertion, cough, or hemoptysis  Cardiovascular:  negative  Gastrointestinal: negative  Genitourinary: negative  Musculoskeletal: negative  Neurologic: negative  Psychiatric: negative  Hematologic/Lymphatic/Immunologic: negative  Endocrine: negative      O:   NAD, WDWN, Alert & Oriented, Mood & Affect wnl, Vitals stable   Here today alone   BP (!) 145/81 (BP Location: Left arm, Patient Position: Sitting, Cuff Size: Adult Regular)   Pulse 119   Resp 20    General appearance rosalba ii   Vitals stable   Alert, oriented and in no acute distress     Faint erythema and red paules on trunk, neck, chest, back   DERMATOGRPAHIA  The remainder of expanded problem focused exam was normal; the following areas were examined:  scalp/hair, conjunctiva/lids, face, neck, lips, chest, digits/nails, RUE, LUE.      Eyes: Conjunctivae/lids:Normal     ENT: Lips, buccal mucosa, tongue: normal    MSK:Normal    Cardiovascular: peripheral edema none    Pulm: Breathing Normal    Neuro/Psych: Orientation:Normal; Mood/Affect:Normal      A/P:  1. dermatographia and dermatitis   Complete pred taper  Two calritin in am   Two allegra bedtime   Lidex twice daily   Skin care regimen reviewed with patient: Eliminate harsh soaps, i.e. Dial, zest, irsih spring; Mild soaps such as Cetaphil or Dove sensitive skin, avoid hot or cold showers, aggressive use of emollients including vanicream, cetaphil or cerave discussed with patient.    Return to clinic 2 weeks

## 2020-11-11 NOTE — PATIENT INSTRUCTIONS
- 2 Claritin in the morning  - 2 Allegra at bedtime    - Stay on the prednisone taper until finished    - Use lidex cream that was prescribed    Return in 2 weeks for a follow-up    Recommended routine general skin care:   Eliminate harsh soaps, i.e. Dial, Zest, Kalani spring.  Use mild soaps such as Cetaphil, Neutrogena or Dove sensitive skin.  Avoid overly hot or cold showers.  Use Vanicream, Cetaphil, Eucerin or Cerave creams to moisturize your skin.  Scoop-able creams are better than thin lotions.  Apply moisturizer immediately to damp skin after patting skin dry with towel.     [employer] Public inFormation Center:    Informative resources for the public to learn more about  skin conditions, tips on performing self-examinations, sun  safety, and more     The public can find information online at  www aad org or by calling [phone] ([phone])

## 2020-11-11 NOTE — LETTER
11/11/2020         RE: Emerald Pearl  [address]  [place] [address]        Dear Colleague,    Thank you for referring your patient, Emerald Pearl, to the [facility]. Please see a copy of my visit note below.    Chief Complaint   Patient presents with     Rash       Vitals:    11/11/20 1319   BP: (!) 145/81   BP Location: Left arm   Patient Position: Sitting   Cuff Size: Adult Regular   Pulse: 119   Resp: 20     Wt Readings from Last 1 Encounters:   11/09/20 88 kg (194 lb)       Hi LIND RN   Specialty Clin[facility]       mEerald Pearl , a 45 year old year old female patient, I was asked to see by SUZIE Delacruz , for rash.  Patient states this has been present for months.  Patient reports the following symptoms:  itching .  Patient reports the following previous treatments prednisone helped some.  .  Patient reports the following modifying factors none.  Associated symptoms: none.  Patient has no other skin complaints today.  Remainder of the HPI, Meds, PMH, Allergies, FH, and SH was reviewed in chart.      Past Medical History:   Diagnosis Date     Asthma      Hypertension      Seasonal allergies        Past Surgical History:   Procedure Laterality Date     NO HISTORY OF SURGERY          Family History   Problem Relation Age of Onset     Hypertension Mother      Lipids Mother      Blood Disease Father         leukemia     Hypertension Father      Lipids Father      Hypertension Paternal Grandfather      Cerebrovascular Disease Paternal Grandfather      Muscular Dystrophy Son         mild.  has too     Muscular Dystrophy Son         mild.  has too     Breast Cancer Maternal Aunt 60     Breast Cancer Other        Social History     Socioeconomic History     Marital status:      Spouse name: Not on file     Number of children: Not on file     Years of education: Not on file     Highest education level: Not on file   Occupational History     Not on file   Social Needs      Financial resource strain: Not on file     Food insecurity     Worry: Not on file     Inability: Not on file     Transportation needs     Medical: Not on file     Non-medical: Not on file   Tobacco Use     Smoking status: Never Smoker     Smokeless tobacco: Never Used   Substance and Sexual Activity     Alcohol use: Yes     Alcohol/week: 0.0 standard drinks     Comment: occ     Drug use: No     Sexual activity: Not on file   Lifestyle     Physical activity     Days per week: Not on file     Minutes per session: Not on file     Stress: Not on file   Relationships     Social connections     Talks on phone: Not on file     Gets together: Not on file     Attends Baptist service: Not on file     Active member of club or organization: Not on file     Attends meetings of clubs or organizations: Not on file     Relationship status: Not on file     Intimate partner violence     Fear of current or ex partner: Not on file     Emotionally abused: Not on file     Physically abused: Not on file     Forced sexual activity: Not on file   Other Topics Concern     Parent/sibling w/ CABG, MI or angioplasty before 65F 55M? Not Asked   Social History Narrative     Not on file       Outpatient Encounter Medications as of 11/11/2020   Medication Sig Dispense Refill     albuterol (PROAIR HFA/PROVENTIL HFA/VENTOLIN HFA) 108 (90 BASE) MCG/ACT Inhaler Inhale 2 puffs krishna lungs q 6 hrs for SOB/wheezing overdue for office appt with  Pankratz plz make appt now before further refills Sept 2017 1 Inhaler 0     fexofenadine (ALLEGRA) 180 MG tablet Take 1 tablet (180 mg) by mouth daily 30 tablet 1     fluocinonide (LIDEX) 0.05 % external solution Apply topically daily 60 mL 1     fluticasone (FLONASE) 50 MCG/ACT nasal spray SPRAY 1-2 SPRAYS INTO BOTH NOSTRILS DAILY 48 mL 3     lisinopril (ZESTRIL) 10 MG tablet Take 1 tablet (10 mg) by mouth daily 90 tablet 3     OMEPRAZOLE PO Take by mouth daily Uses with prednisone due to heartburn        predniSONE (DELTASONE) 20 MG tablet Take 3 tabs by mouth daily x 3 days, then 2 tabs daily x 3 days, then 1 tab daily x 3 days, then 1/2 tab daily x 3 days. 20 tablet 0     Olopatadine HCl 0.7 % SOLN Apply 1 drop to eye daily (Patient not taking: Reported on 10/26/2020) 25 mL 1     No facility-administered encounter medications on file as of 11/11/2020.              Review Of Systems  Skin: As above  Eyes: negative  Ears/Nose/Throat: negative  Respiratory: No shortness of breath, dyspnea on exertion, cough, or hemoptysis  Cardiovascular: negative  Gastrointestinal: negative  Genitourinary: negative  Musculoskeletal: negative  Neurologic: negative  Psychiatric: negative  Hematologic/Lymphatic/Immunologic: negative  Endocrine: negative      O:   NAD, WDWN, Alert & Oriented, Mood & Affect wnl, Vitals stable   Here today alone   BP (!) 145/81 (BP Location: Left arm, Patient Position: Sitting, Cuff Size: Adult Regular)   Pulse 119   Resp 20    General appearance rosalba ii   Vitals stable   Alert, oriented and in no acute distress     Faint erythema and red paules on trunk, neck, chest, back   DERMATOGRPAHIA  The remainder of expanded problem focused exam was normal; the following areas were examined:  scalp/hair, conjunctiva/lids, face, neck, lips, chest, digits/nails, RUE, LUE.      Eyes: Conjunctivae/lids:Normal     ENT: Lips, buccal mucosa, tongue: normal    MSK:Normal    Cardiovascular: peripheral edema none    Pulm: Breathing Normal    Neuro/Psych: Orientation:Normal; Mood/Affect:Normal      A/P:  1. dermatographia and dermatitis   Complete pred taper  Two calritin in am   Two allegra bedtime   Lidex twice daily   Skin care regimen reviewed with patient: Eliminate harsh soaps, i.e. Dial, zest, irsih spring; Mild soaps such as Cetaphil or Dove sensitive skin, avoid hot or cold showers, aggressive use of emollients including vanicream, cetaphil or cerave discussed with patient.    Return to clinic 2 weeks       Again,  thank you for allowing me to participate in the care of your patient.        Sincerely,        Niraj Camacho MD

## 2020-12-02 ENCOUNTER — OFFICE VISIT (OUTPATIENT)
Dept: DERMATOLOGY | Facility: CLINIC | Age: 45
End: 2020-12-02
Payer: COMMERCIAL

## 2020-12-02 VITALS — SYSTOLIC BLOOD PRESSURE: 133 MMHG | OXYGEN SATURATION: 99 % | DIASTOLIC BLOOD PRESSURE: 90 MMHG | HEART RATE: 110 BPM

## 2020-12-02 DIAGNOSIS — L30.9 DERMATITIS: Primary | ICD-10-CM

## 2020-12-02 PROCEDURE — 99212 OFFICE O/P EST SF 10 MIN: CPT | Performed by: DERMATOLOGY

## 2020-12-02 NOTE — PROGRESS NOTES
Emerald Pearl is an extremely pleasant 45 year old year old female patient here today for f/u dermatitis and dermatographia, all clear.  Patient has no other skin complaints today.  Remainder of the HPI, Meds, PMH, Allergies, FH, and SH was reviewed in chart.      Past Medical History:   Diagnosis Date     Asthma      Hypertension      Seasonal allergies        Past Surgical History:   Procedure Laterality Date     NO HISTORY OF SURGERY          Family History   Problem Relation Age of Onset     Hypertension Mother      Lipids Mother      Blood Disease Father         leukemia     Hypertension Father      Lipids Father      Hypertension Paternal Grandfather      Cerebrovascular Disease Paternal Grandfather      Muscular Dystrophy Son         mild.  has too     Muscular Dystrophy Son         mild.  has too     Breast Cancer Maternal Aunt 60     Breast Cancer Other        Social History     Socioeconomic History     Marital status:      Spouse name: Not on file     Number of children: Not on file     Years of education: Not on file     Highest education level: Not on file   Occupational History     Not on file   Social Needs     Financial resource strain: Not on file     Food insecurity     Worry: Not on file     Inability: Not on file     Transportation needs     Medical: Not on file     Non-medical: Not on file   Tobacco Use     Smoking status: Never Smoker     Smokeless tobacco: Never Used   Substance and Sexual Activity     Alcohol use: Yes     Alcohol/week: 0.0 standard drinks     Comment: occ     Drug use: No     Sexual activity: Not on file   Lifestyle     Physical activity     Days per week: Not on file     Minutes per session: Not on file     Stress: Not on file   Relationships     Social connections     Talks on phone: Not on file     Gets together: Not on file     Attends Oriental orthodox service: Not on file     Active member of club or organization: Not on file     Attends meetings of clubs  or organizations: Not on file     Relationship status: Not on file     Intimate partner violence     Fear of current or ex partner: Not on file     Emotionally abused: Not on file     Physically abused: Not on file     Forced sexual activity: Not on file   Other Topics Concern     Parent/sibling w/ CABG, MI or angioplasty before 65F 55M? Not Asked   Social History Narrative     Not on file       Outpatient Encounter Medications as of 12/2/2020   Medication Sig Dispense Refill     albuterol (PROAIR HFA/PROVENTIL HFA/VENTOLIN HFA) 108 (90 BASE) MCG/ACT Inhaler Inhale 2 puffs krishna lungs q 6 hrs for SOB/wheezing overdue for office appt with  Maileatz plz make appt now before further refills Sept 2017 1 Inhaler 0     fexofenadine (ALLEGRA) 180 MG tablet Take 1 tablet (180 mg) by mouth daily 30 tablet 1     fluocinonide (LIDEX) 0.05 % external cream Apply topically 2 times daily 120 g 3     lisinopril (ZESTRIL) 10 MG tablet Take 1 tablet (10 mg) by mouth daily 90 tablet 3     OMEPRAZOLE PO Take by mouth daily Uses with prednisone due to heartburn       fluocinonide (LIDEX) 0.05 % external solution Apply topically daily (Patient not taking: Reported on 12/2/2020) 60 mL 1     fluticasone (FLONASE) 50 MCG/ACT nasal spray SPRAY 1-2 SPRAYS INTO BOTH NOSTRILS DAILY (Patient not taking: Reported on 12/2/2020) 48 mL 3     Olopatadine HCl 0.7 % SOLN Apply 1 drop to eye daily (Patient not taking: Reported on 10/26/2020) 25 mL 1     predniSONE (DELTASONE) 20 MG tablet Take 3 tabs by mouth daily x 3 days, then 2 tabs daily x 3 days, then 1 tab daily x 3 days, then 1/2 tab daily x 3 days. (Patient not taking: Reported on 12/2/2020) 20 tablet 0     No facility-administered encounter medications on file as of 12/2/2020.              Review Of Systems  Skin: As above  Eyes: negative  Ears/Nose/Throat: negative  Respiratory: No shortness of breath, dyspnea on exertion, cough, or hemoptysis  Cardiovascular: negative  Gastrointestinal:  negative  Genitourinary: negative  Musculoskeletal: negative  Neurologic: negative  Psychiatric: negative  Hematologic/Lymphatic/Immunologic: negative  Endocrine: negative      O:   NAD, WDWN, Alert & Oriented, Mood & Affect wnl, Vitals stable   Here today alone   BP (!) 133/90   Pulse 110   SpO2 99%    General appearance normal   Vitals stable   Alert, oriented and in no acute distress     Hands, neck chest clear     The remainder of expanded problem focused exam was normal; the following areas were examined:  conjunctiva/lids, face, neck, , chest, digits/nails, RUE, LUE.      Eyes: Conjunctivae/lids:Normal     ENT: Lips, buccal mucosa, tongue: normal    MSK:Normal    Cardiovascular: peripheral edema none    Pulm: Breathing Normal    Neuro/Psych: Orientation:Alert and Orientedx3 ; Mood/Affect:normal       A/P:  1. Dermatitis and dermatographia clear  Decrease one antihistmaines in am and pm   discharge lidex  Skin care regimen reviewed with patient: Eliminate harsh soaps, i.e. Dial, zest, irsih spring; Mild soaps such as Cetaphil or Dove sensitive skin, avoid hot or cold showers, aggressive use of emollients including vanicream, cetaphil or cerave discussed with patient.    Return to clinic 2 months virtual

## 2020-12-02 NOTE — NURSING NOTE
Chief Complaint   Patient presents with     Rash     fu       Vitals:    12/02/20 1443   BP: (!) 133/90   Pulse: 110   SpO2: 99%     Wt Readings from Last 1 Encounters:   11/09/20 88 kg (194 lb)       Mireya Cook LPN.................12/2/2020

## 2020-12-02 NOTE — LETTER
12/2/2020         RE: Emerald Pearl  [address] [place] [address]        Dear Colleague,    Thank you for referring your patient, Emerald Pearl, to the [facility]. Please see a copy of my visit note below.    Emerald Pearl is an extremely pleasant 45 year old year old female patient here today for f/u dermatitis and dermatographia, all clear.  Patient has no other skin complaints today.  Remainder of the HPI, Meds, PMH, Allergies, FH, and SH was reviewed in chart.      Past Medical History:   Diagnosis Date     Asthma      Hypertension      Seasonal allergies        Past Surgical History:   Procedure Laterality Date     NO HISTORY OF SURGERY          Family History   Problem Relation Age of Onset     Hypertension Mother      Lipids Mother      Blood Disease Father         leukemia     Hypertension Father      Lipids Father      Hypertension Paternal Grandfather      Cerebrovascular Disease Paternal Grandfather      Muscular Dystrophy Son         mild.  has too     Muscular Dystrophy Son         mild.  has too     Breast Cancer Maternal Aunt 60     Breast Cancer Other        Social History     Socioeconomic History     Marital status:      Spouse name: Not on file     Number of children: Not on file     Years of education: Not on file     Highest education level: Not on file   Occupational History     Not on file   Social Needs     Financial resource strain: Not on file     Food insecurity     Worry: Not on file     Inability: Not on file     Transportation needs     Medical: Not on file     Non-medical: Not on file   Tobacco Use     Smoking status: Never Smoker     Smokeless tobacco: Never Used   Substance and Sexual Activity     Alcohol use: Yes     Alcohol/week: 0.0 standard drinks     Comment: occ     Drug use: No     Sexual activity: Not on file   Lifestyle     Physical activity     Days per week: Not on file     Minutes per session: Not on file     Stress: Not  on file   Relationships     Social connections     Talks on phone: Not on file     Gets together: Not on file     Attends Judaism service: Not on file     Active member of club or organization: Not on file     Attends meetings of clubs or organizations: Not on file     Relationship status: Not on file     Intimate partner violence     Fear of current or ex partner: Not on file     Emotionally abused: Not on file     Physically abused: Not on file     Forced sexual activity: Not on file   Other Topics Concern     Parent/sibling w/ CABG, MI or angioplasty before 65F 55M? Not Asked   Social History Narrative     Not on file       Outpatient Encounter Medications as of 12/2/2020   Medication Sig Dispense Refill     albuterol (PROAIR HFA/PROVENTIL HFA/VENTOLIN HFA) 108 (90 BASE) MCG/ACT Inhaler Inhale 2 puffs krishna lungs q 6 hrs for SOB/wheezing overdue for office appt with  Pankratz plz make appt now before further refills Sept 2017 1 Inhaler 0     fexofenadine (ALLEGRA) 180 MG tablet Take 1 tablet (180 mg) by mouth daily 30 tablet 1     fluocinonide (LIDEX) 0.05 % external cream Apply topically 2 times daily 120 g 3     lisinopril (ZESTRIL) 10 MG tablet Take 1 tablet (10 mg) by mouth daily 90 tablet 3     OMEPRAZOLE PO Take by mouth daily Uses with prednisone due to heartburn       fluocinonide (LIDEX) 0.05 % external solution Apply topically daily (Patient not taking: Reported on 12/2/2020) 60 mL 1     fluticasone (FLONASE) 50 MCG/ACT nasal spray SPRAY 1-2 SPRAYS INTO BOTH NOSTRILS DAILY (Patient not taking: Reported on 12/2/2020) 48 mL 3     Olopatadine HCl 0.7 % SOLN Apply 1 drop to eye daily (Patient not taking: Reported on 10/26/2020) 25 mL 1     predniSONE (DELTASONE) 20 MG tablet Take 3 tabs by mouth daily x 3 days, then 2 tabs daily x 3 days, then 1 tab daily x 3 days, then 1/2 tab daily x 3 days. (Patient not taking: Reported on 12/2/2020) 20 tablet 0     No facility-administered encounter medications on file  as of 12/2/2020.              Review Of Systems  Skin: As above  Eyes: negative  Ears/Nose/Throat: negative  Respiratory: No shortness of breath, dyspnea on exertion, cough, or hemoptysis  Cardiovascular: negative  Gastrointestinal: negative  Genitourinary: negative  Musculoskeletal: negative  Neurologic: negative  Psychiatric: negative  Hematologic/Lymphatic/Immunologic: negative  Endocrine: negative      O:   NAD, WDWN, Alert & Oriented, Mood & Affect wnl, Vitals stable   Here today alone   BP (!) 133/90   Pulse 110   SpO2 99%    General appearance normal   Vitals stable   Alert, oriented and in no acute distress     Hands, neck chest clear     The remainder of expanded problem focused exam was normal; the following areas were examined:  conjunctiva/lids, face, neck, , chest, digits/nails, RUE, LUE.      Eyes: Conjunctivae/lids:Normal     ENT: Lips, buccal mucosa, tongue: normal    MSK:Normal    Cardiovascular: peripheral edema none    Pulm: Breathing Normal    Neuro/Psych: Orientation:Alert and Orientedx3 ; Mood/Affect:normal       A/P:  1. Dermatitis and dermatographia clear  Decrease one antihistmaines in am and pm   discharge lidex  Skin care regimen reviewed with patient: Eliminate harsh soaps, i.e. Dial, zest, irsih spring; Mild soaps such as Cetaphil or Dove sensitive skin, avoid hot or cold showers, aggressive use of emollients including vanicream, cetaphil or cerave discussed with patient.    Return to clinic 2 months virtual         Again, thank you for allowing me to participate in the care of your patient.        Sincerely,        Niraj Camacho MD

## 2021-02-03 ENCOUNTER — VIRTUAL VISIT (OUTPATIENT)
Dept: DERMATOLOGY | Facility: CLINIC | Age: 46
End: 2021-02-03
Payer: COMMERCIAL

## 2021-02-03 DIAGNOSIS — L50.9 URTICARIA: Primary | ICD-10-CM

## 2021-02-03 PROCEDURE — 99212 OFFICE O/P EST SF 10 MIN: CPT | Mod: TEL | Performed by: DERMATOLOGY

## 2021-02-03 NOTE — PROGRESS NOTES
"Emerald Pearl is a 45 year old female who is being evaluated via a phone  visit.      The patient has been notified of following:     \"This phone  visit will be conducted via a call between you and your physician/provider. We have found that certain health care needs can be provided without the need for an in-person physical exam.  This service lets us provide the care you need with a video conversation.  If a prescription is necessary we can send it directly to your pharmacy.  If lab work is needed we can place an order for that and you can then stop by our lab to have the test done at a later time.    Phone visits are billed at different rates depending on your insurance coverage.  Please reach out to your insurance provider with any questions.    If during the course of the call the physician/provider feels a phone visit is not appropriate, you will not be charged for this service.\"    Patient has given verbal consent for phone visit? Yes    How would you like to obtain your AVS? Tony    Emerald Pearl is a 45 year old year old female patient here today for f/u urticaria, doing well on two antihistmaines, gets occasional lesions.  Currently happy with current regimen.  Patient has no other skin complaints today.  Remainder of the HPI, Meds, PMH, Allergies, FH, and SH was reviewed in chart.      Past Medical History:   Diagnosis Date     Asthma      Hypertension      Seasonal allergies        Past Surgical History:   Procedure Laterality Date     NO HISTORY OF SURGERY          Family History   Problem Relation Age of Onset     Hypertension Mother      Lipids Mother      Blood Disease Father         leukemia     Hypertension Father      Lipids Father      Hypertension Paternal Grandfather      Cerebrovascular Disease Paternal Grandfather      Muscular Dystrophy Son         mild.  has too     Muscular Dystrophy Son         mild.  has too     Breast Cancer Maternal Aunt 60     Breast Cancer Other  "       Social History     Socioeconomic History     Marital status:      Spouse name: Not on file     Number of children: Not on file     Years of education: Not on file     Highest education level: Not on file   Occupational History     Not on file   Social Needs     Financial resource strain: Not on file     Food insecurity     Worry: Not on file     Inability: Not on file     Transportation needs     Medical: Not on file     Non-medical: Not on file   Tobacco Use     Smoking status: Never Smoker     Smokeless tobacco: Never Used   Substance and Sexual Activity     Alcohol use: Yes     Alcohol/week: 0.0 standard drinks     Comment: occ     Drug use: No     Sexual activity: Not on file   Lifestyle     Physical activity     Days per week: Not on file     Minutes per session: Not on file     Stress: Not on file   Relationships     Social connections     Talks on phone: Not on file     Gets together: Not on file     Attends Yazidi service: Not on file     Active member of club or organization: Not on file     Attends meetings of clubs or organizations: Not on file     Relationship status: Not on file     Intimate partner violence     Fear of current or ex partner: Not on file     Emotionally abused: Not on file     Physically abused: Not on file     Forced sexual activity: Not on file   Other Topics Concern     Parent/sibling w/ CABG, MI or angioplasty before 65F 55M? Not Asked   Social History Narrative     Not on file       Outpatient Encounter Medications as of 2/3/2021   Medication Sig Dispense Refill     albuterol (PROAIR HFA/PROVENTIL HFA/VENTOLIN HFA) 108 (90 BASE) MCG/ACT Inhaler Inhale 2 puffs krishna lungs q 6 hrs for SOB/wheezing overdue for office appt with  Pankratz plz make appt now before further refills Sept 2017 1 Inhaler 0     fexofenadine (ALLEGRA) 180 MG tablet Take 1 tablet (180 mg) by mouth daily 30 tablet 1     fluocinonide (LIDEX) 0.05 % external cream Apply topically 2 times daily 120 g 3      fluocinonide (LIDEX) 0.05 % external solution Apply topically daily (Patient not taking: Reported on 12/2/2020) 60 mL 1     fluticasone (FLONASE) 50 MCG/ACT nasal spray SPRAY 1-2 SPRAYS INTO BOTH NOSTRILS DAILY (Patient not taking: Reported on 12/2/2020) 48 mL 3     lisinopril (ZESTRIL) 10 MG tablet Take 1 tablet (10 mg) by mouth daily 90 tablet 3     Olopatadine HCl 0.7 % SOLN Apply 1 drop to eye daily (Patient not taking: Reported on 10/26/2020) 25 mL 1     OMEPRAZOLE PO Take by mouth daily Uses with prednisone due to heartburn       predniSONE (DELTASONE) 20 MG tablet Take 3 tabs by mouth daily x 3 days, then 2 tabs daily x 3 days, then 1 tab daily x 3 days, then 1/2 tab daily x 3 days. (Patient not taking: Reported on 12/2/2020) 20 tablet 0     No facility-administered encounter medications on file as of 2/3/2021.              Review Of Systems  Skin: As above  Eyes: negative  Ears/Nose/Throat: negative  Respiratory: No shortness of breath, dyspnea on exertion, cough, or hemoptysis  Cardiovascular: negative  Gastrointestinal: negative  Genitourinary: negative  Musculoskeletal: negative  Neurologic: negative  Psychiatric: negative  Hematologic/Lymphatic/Immunologic: negative  Endocrine: negative      O:   Alert & Orientedx3, Mood & Affect wnl,    General appearance normal   Alert, oriented and in no acute distress    Clear patient    Pulm: Breathing Normal, talking in normal sentences, no shortness of breath during conversation    Neuro/Psych: Orientation:Alert and Orientedx3 ; Mood/Affect:normal ; no anxiety or depression     A/P:  1. Urticaria   It was a pleasure speaking to Emerald Pearl today.  Other oral options and light discussed with patient   She is currently happy  Cont current regimen   Skin care regimen reviewed with patient: Eliminate harsh soaps, i.e. Dial, zest, irsih spring; Mild soaps such as Cetaphil or Dove sensitive skin, avoid hot or cold showers, aggressive use of emollients including  vanicream, cetaphil or cerave discussed with patient.    Return to clinic as needed     Teledermatology information:  - Location of patient: home  - Location of teledermatologist: [facility]   - Reason teledermatology is appropriate: of National Emergency Regarding Coronavirus disease (COVID 19) Outbreak  - The patient's condition can safely be assessed using telemedicine: yes  - Method of transmission: store and forward teledermatology  - In-person dermatology visit recommendation: no  - Service start time:928am/pm  - Service end time:932am/pm  - Date of report: 02/03/21

## 2021-02-03 NOTE — LETTER
"    2/3/2021         RE: Emerald Pearl  [address] [address] [facility] [address]        Dear Colleague,    Thank you for referring your patient, Emerald Pearl, to the [facility]. Please see a copy of my visit note below.        Emerald Pearl is a 45 year old female who is being evaluated via a phone  visit.      The patient has been notified of following:     \"This phone  visit will be conducted via a call between you and your physician/provider. We have found that certain health care needs can be provided without the need for an in-person physical exam.  This service lets us provide the care you need with a video conversation.  If a prescription is necessary we can send it directly to your pharmacy.  If lab work is needed we can place an order for that and you can then stop by our lab to have the test done at a later time.    Phone visits are billed at different rates depending on your insurance coverage.  Please reach out to your insurance provider with any questions.    If during the course of the call the physician/provider feels a phone visit is not appropriate, you will not be charged for this service.\"    Patient has given verbal consent for phone visit? Yes    How would you like to obtain your AVS? MyChart    Emerald Pearl is a 45 year old year old female patient here today for f/u urticaria, doing well on two antihistmaines, gets occasional lesions.  Currently happy with current regimen.  Patient has no other skin complaints today.  Remainder of the HPI, Meds, PMH, Allergies, FH, and SH was reviewed in chart.      Past Medical History:   Diagnosis Date     Asthma      Hypertension      Seasonal allergies        Past Surgical History:   Procedure Laterality Date     NO HISTORY OF SURGERY          Family History   Problem Relation Age of Onset     Hypertension Mother      Lipids Mother      Blood Disease Father         leukemia     Hypertension Father      Lipids Father      Hypertension " Paternal Grandfather      Cerebrovascular Disease Paternal Grandfather      Muscular Dystrophy Son         mild.  has too     Muscular Dystrophy Son         mild.  has too     Breast Cancer Maternal Aunt 60     Breast Cancer Other        Social History     Socioeconomic History     Marital status:      Spouse name: Not on file     Number of children: Not on file     Years of education: Not on file     Highest education level: Not on file   Occupational History     Not on file   Social Needs     Financial resource strain: Not on file     Food insecurity     Worry: Not on file     Inability: Not on file     Transportation needs     Medical: Not on file     Non-medical: Not on file   Tobacco Use     Smoking status: Never Smoker     Smokeless tobacco: Never Used   Substance and Sexual Activity     Alcohol use: Yes     Alcohol/week: 0.0 standard drinks     Comment: occ     Drug use: No     Sexual activity: Not on file   Lifestyle     Physical activity     Days per week: Not on file     Minutes per session: Not on file     Stress: Not on file   Relationships     Social connections     Talks on phone: Not on file     Gets together: Not on file     Attends Sikh service: Not on file     Active member of club or organization: Not on file     Attends meetings of clubs or organizations: Not on file     Relationship status: Not on file     Intimate partner violence     Fear of current or ex partner: Not on file     Emotionally abused: Not on file     Physically abused: Not on file     Forced sexual activity: Not on file   Other Topics Concern     Parent/sibling w/ CABG, MI or angioplasty before 65F 55M? Not Asked   Social History Narrative     Not on file       Outpatient Encounter Medications as of 2/3/2021   Medication Sig Dispense Refill     albuterol (PROAIR HFA/PROVENTIL HFA/VENTOLIN HFA) 108 (90 BASE) MCG/ACT Inhaler Inhale 2 puffs krishna lungs q 6 hrs for SOB/wheezing overdue for office appt with   Pankratz plz make appt now before further refills Sept 2017 1 Inhaler 0     fexofenadine (ALLEGRA) 180 MG tablet Take 1 tablet (180 mg) by mouth daily 30 tablet 1     fluocinonide (LIDEX) 0.05 % external cream Apply topically 2 times daily 120 g 3     fluocinonide (LIDEX) 0.05 % external solution Apply topically daily (Patient not taking: Reported on 12/2/2020) 60 mL 1     fluticasone (FLONASE) 50 MCG/ACT nasal spray SPRAY 1-2 SPRAYS INTO BOTH NOSTRILS DAILY (Patient not taking: Reported on 12/2/2020) 48 mL 3     lisinopril (ZESTRIL) 10 MG tablet Take 1 tablet (10 mg) by mouth daily 90 tablet 3     Olopatadine HCl 0.7 % SOLN Apply 1 drop to eye daily (Patient not taking: Reported on 10/26/2020) 25 mL 1     OMEPRAZOLE PO Take by mouth daily Uses with prednisone due to heartburn       predniSONE (DELTASONE) 20 MG tablet Take 3 tabs by mouth daily x 3 days, then 2 tabs daily x 3 days, then 1 tab daily x 3 days, then 1/2 tab daily x 3 days. (Patient not taking: Reported on 12/2/2020) 20 tablet 0     No facility-administered encounter medications on file as of 2/3/2021.              Review Of Systems  Skin: As above  Eyes: negative  Ears/Nose/Throat: negative  Respiratory: No shortness of breath, dyspnea on exertion, cough, or hemoptysis  Cardiovascular: negative  Gastrointestinal: negative  Genitourinary: negative  Musculoskeletal: negative  Neurologic: negative  Psychiatric: negative  Hematologic/Lymphatic/Immunologic: negative  Endocrine: negative      O:   Alert & Orientedx3, Mood & Affect wnl,    General appearance normal   Alert, oriented and in no acute distress    Clear patient    Pulm: Breathing Normal, talking in normal sentences, no shortness of breath during conversation    Neuro/Psych: Orientation:Alert and Orientedx3 ; Mood/Affect:normal ; no anxiety or depression     A/P:  1. Urticaria   It was a pleasure speaking to Emerald Pearl today.  Other oral options and light discussed with patient   She is  currently happy  Cont current regimen   Skin care regimen reviewed with patient: Eliminate harsh soaps, i.e. Dial, zest, irsih spring; Mild soaps such as Cetaphil or Dove sensitive skin, avoid hot or cold showers, aggressive use of emollients including vanicream, cetaphil or cerave discussed with patient.    Return to clinic as needed     Teledermatology information:  - Location of patient: home  - Location of teledermatologist: [facility]   - Reason teledermatology is appropriate: of National Emergency Regarding Coronavirus disease (COVID 19) Outbreak  - The patient's condition can safely be assessed using telemedicine: yes  - Method of transmission: store and forward teledermatology  - In-person dermatology visit recommendation: no  - Service start time:928am/pm  - Service end time:932am/pm  - Date of report: 02/03/21        Again, thank you for allowing me to participate in the care of your patient.        Sincerely,        Niraj Camacho MD

## 2021-10-03 ENCOUNTER — HEALTH MAINTENANCE LETTER (OUTPATIENT)
Age: 46
End: 2021-10-03

## 2021-11-01 DIAGNOSIS — J30.2 CHRONIC SEASONAL ALLERGIC RHINITIS: ICD-10-CM

## 2021-11-01 RX ORDER — FLUTICASONE PROPIONATE 50 MCG
SPRAY, SUSPENSION (ML) NASAL
Qty: 48 ML | Refills: 3 | Status: SHIPPED | OUTPATIENT
Start: 2021-11-01 | End: 2022-11-25

## 2021-11-28 ENCOUNTER — HEALTH MAINTENANCE LETTER (OUTPATIENT)
Age: 46
End: 2021-11-28

## 2021-12-22 ENCOUNTER — OFFICE VISIT (OUTPATIENT)
Dept: FAMILY MEDICINE | Facility: CLINIC | Age: 46
End: 2021-12-22
Payer: COMMERCIAL

## 2021-12-22 VITALS
SYSTOLIC BLOOD PRESSURE: 136 MMHG | OXYGEN SATURATION: 98 % | DIASTOLIC BLOOD PRESSURE: 86 MMHG | TEMPERATURE: 98.1 F | BODY MASS INDEX: 33.31 KG/M2 | HEIGHT: 63 IN | WEIGHT: 188 LBS | HEART RATE: 93 BPM

## 2021-12-22 DIAGNOSIS — E04.9 THYROID ENLARGEMENT: ICD-10-CM

## 2021-12-22 DIAGNOSIS — L40.9 SCALP PSORIASIS: ICD-10-CM

## 2021-12-22 DIAGNOSIS — J45.20 MILD INTERMITTENT ASTHMA WITHOUT COMPLICATION: ICD-10-CM

## 2021-12-22 DIAGNOSIS — Z12.31 ENCOUNTER FOR SCREENING MAMMOGRAM FOR MALIGNANT NEOPLASM OF BREAST: ICD-10-CM

## 2021-12-22 DIAGNOSIS — I10 ESSENTIAL HYPERTENSION WITH GOAL BLOOD PRESSURE LESS THAN 140/90: ICD-10-CM

## 2021-12-22 DIAGNOSIS — Z00.00 ROUTINE GENERAL MEDICAL EXAMINATION AT A HEALTH CARE FACILITY: Primary | ICD-10-CM

## 2021-12-22 LAB
ANION GAP SERPL CALCULATED.3IONS-SCNC: 8 MMOL/L (ref 3–14)
BUN SERPL-MCNC: 14 MG/DL (ref 7–30)
CALCIUM SERPL-MCNC: 8.8 MG/DL (ref 8.5–10.1)
CHLORIDE BLD-SCNC: 109 MMOL/L (ref 94–109)
CO2 SERPL-SCNC: 24 MMOL/L (ref 20–32)
CREAT SERPL-MCNC: 0.76 MG/DL (ref 0.52–1.04)
GFR SERPL CREATININE-BSD FRML MDRD: >90 ML/MIN/1.73M2
GLUCOSE BLD-MCNC: 92 MG/DL (ref 70–99)
POTASSIUM BLD-SCNC: 4.3 MMOL/L (ref 3.4–5.3)
SODIUM SERPL-SCNC: 141 MMOL/L (ref 133–144)
TSH SERPL DL<=0.005 MIU/L-ACNC: 1.52 MU/L (ref 0.4–4)

## 2021-12-22 PROCEDURE — 84443 ASSAY THYROID STIM HORMONE: CPT | Performed by: FAMILY MEDICINE

## 2021-12-22 PROCEDURE — 36415 COLL VENOUS BLD VENIPUNCTURE: CPT | Performed by: FAMILY MEDICINE

## 2021-12-22 PROCEDURE — 99214 OFFICE O/P EST MOD 30 MIN: CPT | Mod: 25 | Performed by: FAMILY MEDICINE

## 2021-12-22 PROCEDURE — 80048 BASIC METABOLIC PNL TOTAL CA: CPT | Performed by: FAMILY MEDICINE

## 2021-12-22 PROCEDURE — 99396 PREV VISIT EST AGE 40-64: CPT | Performed by: FAMILY MEDICINE

## 2021-12-22 RX ORDER — FLUOCINONIDE TOPICAL SOLUTION USP, 0.05% 0.5 MG/ML
SOLUTION TOPICAL DAILY
Qty: 60 ML | Refills: 1 | Status: SHIPPED | OUTPATIENT
Start: 2021-12-22 | End: 2022-06-15

## 2021-12-22 RX ORDER — ALBUTEROL SULFATE 90 UG/1
AEROSOL, METERED RESPIRATORY (INHALATION)
Qty: 18 G | Refills: 1 | Status: SHIPPED | OUTPATIENT
Start: 2021-12-22 | End: 2023-05-31

## 2021-12-22 RX ORDER — LISINOPRIL 10 MG/1
10 TABLET ORAL DAILY
Qty: 90 TABLET | Refills: 3 | Status: SHIPPED | OUTPATIENT
Start: 2021-12-22 | End: 2023-02-07

## 2021-12-22 ASSESSMENT — ENCOUNTER SYMPTOMS
HEARTBURN: 0
PALPITATIONS: 0
DYSURIA: 0
HEMATURIA: 0
ABDOMINAL PAIN: 0
PARESTHESIAS: 0
BREAST MASS: 0
FEVER: 0
MYALGIAS: 0
ARTHRALGIAS: 0
EYE PAIN: 0
HEMATOCHEZIA: 0
CHILLS: 0
WEAKNESS: 0
SHORTNESS OF BREATH: 0
FREQUENCY: 0
DIZZINESS: 0
DIARRHEA: 0
JOINT SWELLING: 0
NAUSEA: 0
NERVOUS/ANXIOUS: 0
HEADACHES: 0
CONSTIPATION: 0
SORE THROAT: 0
COUGH: 1

## 2021-12-22 ASSESSMENT — MIFFLIN-ST. JEOR: SCORE: 1451.89

## 2021-12-22 NOTE — PATIENT INSTRUCTIONS
Preventive Health Recommendations  Female Ages 40 to 49    Yearly exam:     See your health care provider every year in order to  1. Review health changes.   2. Discuss preventive care.    3. Review your medicines if your doctor prescribed any.      Get a Pap test every three years (unless you have an abnormal result and your provider advises testing more often).      If you get Pap tests with HPV test, you only need to test every 5 years, unless you have an abnormal result. You do not need a Pap test if your uterus was removed (hysterectomy) and you have not had cancer.      You should be tested each year for STDs (sexually transmitted diseases), if you're at risk.     Ask your doctor if you should have a mammogram.      Have a colonoscopy (test for colon cancer) if someone in your family has had colon cancer or polyps before age 50.       Have a cholesterol test every 5 years.       Have a diabetes test (fasting glucose) after age 45. If you are at risk for diabetes, you should have this test every 3 years.    Shots: Get a flu shot each year. Get a tetanus shot every 10 years.     Nutrition:     Eat at least 5 servings of fruits and vegetables each day.    Eat whole-grain bread, whole-wheat pasta and brown rice instead of white grains and rice.    Get adequate Calcium and Vitamin D.      Lifestyle    Exercise at least 150 minutes a week (an average of 30 minutes a day, 5 days a week). This will help you control your weight and prevent disease.    Limit alcohol to one drink per day.    No smoking.     Wear sunscreen to prevent skin cancer.    See your dentist every six months for an exam and cleaning.      Make the appointment for the ultrasound and the mammo  You will likely have a good idea during the ultrasound what the next step is

## 2021-12-22 NOTE — PROGRESS NOTES
SUBJECTIVE:   CC: Emerald Pearl is an 46 year old woman who presents for preventive health visit.     Patient has been advised of split billing requirements and indicates understanding: Yes  Healthy Habits:     Getting at least 3 servings of Calcium per day:  Yes    Bi-annual eye exam:  Yes    Dental care twice a year:  NO    Sleep apnea or symptoms of sleep apnea:  None    Diet:  Regular (no restrictions)    Frequency of exercise:  4-5 days/week    Duration of exercise:  45-60 minutes    Taking medications regularly:  Yes    Medication side effects:  None    PHQ-2 Total Score: 0    Additional concerns today:  No    *Discuss mammogram     Hypertension Follow-up    Do you check your blood pressure regularly outside of the clinic? Yes     Are you following a low salt diet? Yes    Are your blood pressures ever more than 140 on the top number (systolic) OR more   than 90 on the bottom number (diastolic), for example 140/90? Yes    Asthma Follow-Up  Was ACT completed today?    Yes    ACT Total Scores 12/22/2021   ACT TOTAL SCORE -   ASTHMA ER VISITS -   ASTHMA HOSPITALIZATIONS -   ACT TOTAL SCORE (Goal Greater than or Equal to 20) 20   In the past 12 months, how many times did you visit the emergency room for your asthma without being admitted to the hospital? 0   In the past 12 months, how many times were you hospitalized overnight because of your asthma? 0          How many days per week do you miss taking your asthma controller medication?  I do not have an asthma controller medication    Please describe any recent triggers for your asthma: upper respiratory infections and cold air    Have you had any Emergency Room Visits, Urgent Care Visits, or Hospital Admissions since your last office visit?  No        Today's PHQ-2 Score:   PHQ-2 ( 1999 [employer]) 12/22/2021   Q1: Little interest or pleasure in doing things 0   Q2: Feeling down, depressed or hopeless 0   PHQ-2 Score 0   PHQ-2 Total Score (12-17 Years)- Positive  if 3 or more points; Administer PHQ-A if positive -   Q1: Little interest or pleasure in doing things Not at all   Q2: Feeling down, depressed or hopeless Not at all   PHQ-2 Score 0       Abuse: Current or Past (Physical, Sexual or Emotional) - No  Do you feel safe in your environment? Yes    Have you ever done Advance Care Planning? (For example, a Health Directive, POLST, or a discussion with a medical provider or your loved ones about your wishes): No, advance care planning information given to patient to review.  Patient declined advance care planning discussion at this time.    Social History     Tobacco Use     Smoking status: Never Smoker     Smokeless tobacco: Never Used   Substance Use Topics     Alcohol use: Yes     Alcohol/week: 0.0 standard drinks     Comment: occ     If you drink alcohol do you typically have >3 drinks per day or >7 drinks per week? No    Alcohol Use 12/22/2021   Prescreen: >3 drinks/day or >7 drinks/week? Not Applicable   Prescreen: >3 drinks/day or >7 drinks/week? -   No flowsheet data found.    Reviewed orders with patient.  Reviewed health maintenance and updated orders accordingly - Yes  Lab work is in process    Breast Cancer Screening:    FHS-7:   Breast CA Risk Assessment (FHS-7) 12/22/2021   Did any of your first-degree relatives have breast or ovarian cancer? No   Did any of your relatives have bilateral breast cancer? No   Did any man in your family have breast cancer? No   Did any woman in your family have breast and ovarian cancer? Yes   Did any woman in your family have breast cancer before age 50 y? No   Do you have 2 or more relatives with breast and/or ovarian cancer? No   Do you have 2 or more relatives with breast and/or bowel cancer? No       Mammogram Screening: Recommended annual mammography  Pertinent mammograms are reviewed under the imaging tab.    History of abnormal Pap smear: NO - age 30-65 PAP every 5 years with negative HPV co-testing recommended  PAP / HPV  "Latest Ref Rng & Units 12/12/2018 10/26/2015   PAP (Historical) - NIL NIL   HPV16 NEG:Negative Negative Negative   HPV18 NEG:Negative Negative Negative   HRHPV NEG:Negative Negative Negative     Reviewed and updated as needed this visit by clinical staff  Tobacco  Allergies  Meds   Med Hx  Surg Hx  Fam Hx  Soc Hx       Reviewed and updated as needed this visit by Provider                   Review of Systems   Constitutional: Negative for chills and fever.   HENT: Positive for congestion. Negative for ear pain, hearing loss and sore throat.    Eyes: Negative for pain and visual disturbance.   Respiratory: Positive for cough. Negative for shortness of breath.    Cardiovascular: Negative for chest pain, palpitations and peripheral edema.   Gastrointestinal: Negative for abdominal pain, constipation, diarrhea, heartburn, hematochezia and nausea.   Breasts:  Negative for tenderness, breast mass and discharge.   Genitourinary: Negative for dysuria, frequency, genital sores, hematuria, pelvic pain, urgency, vaginal bleeding and vaginal discharge.   Musculoskeletal: Negative for arthralgias, joint swelling and myalgias.   Skin: Negative for rash.   Neurological: Negative for dizziness, weakness, headaches and paresthesias.   Psychiatric/Behavioral: Negative for mood changes. The patient is not nervous/anxious.    has history of  Scalp psoriasis. She has used topical with relief. Only in he scalp she denies having arthralgias  Takes hypertension medications and is controlled   Asthma is well controlled uses albuterol only as needed no emergency room visits no shortness of breath uses during uris        OBJECTIVE:   /86   Pulse 93   Temp 98.1  F (36.7  C) (Tympanic)   Ht 1.6 m (5' 3\")   Wt 85.3 kg (188 lb)   SpO2 98%   BMI 33.30 kg/m    Physical Exam  GENERAL: healthy, alert and no distress  EYES: Eyes grossly normal to inspection, PERRL and conjunctivae and sclerae normal  HENT: ear canals and TM's normal, " nose and mouth without ulcers or lesions  NECK: no adenopathy and assymetry right lobe with enlargement and palpable nodule   RESP: lungs clear to auscultation - no rales, rhonchi or wheezes  BREAST: normal without masses, tenderness or nipple discharge and no palpable axillary masses or adenopathy  CV: regular rate and rhythm, normal S1 S2, no S3 or S4, no murmur, click or rub, no peripheral edema and peripheral pulses strong  ABDOMEN: soft, nontender, no hepatosplenomegaly, no masses and bowel sounds normal  MS: no gross musculoskeletal defects noted, no edema  SKIN: no suspicious lesions or rashes and scalp with minimal flaking and controlled .  NEURO: Normal strength and tone, mentation intact and speech normal  PSYCH: mentation appears normal, affect normal/bright    Diagnostic Test Results:  Labs reviewed in Epic  Results for orders placed or performed in visit on 12/22/21   Basic metabolic panel  (Ca, Cl, CO2, Creat, Gluc, K, Na, BUN)     Status: Normal   Result Value Ref Range    Sodium 141 133 - 144 mmol/L    Potassium 4.3 3.4 - 5.3 mmol/L    Chloride 109 94 - 109 mmol/L    Carbon Dioxide (CO2) 24 20 - 32 mmol/L    Anion Gap 8 3 - 14 mmol/L    Urea Nitrogen 14 7 - 30 mg/dL    Creatinine 0.76 0.52 - 1.04 mg/dL    Calcium 8.8 8.5 - 10.1 mg/dL    Glucose 92 70 - 99 mg/dL    GFR Estimate >90 >60 mL/min/1.73m2   TSH with free T4 reflex     Status: Normal   Result Value Ref Range    TSH 1.52 0.40 - 4.00 mU/L       ASSESSMENT/PLAN:   (Z00.00) Routine general medical examination at a health care facility  (primary encounter diagnosis)  Comment:    Plan:     (J45.20) Mild intermittent asthma without complication  Comment: well controlled   Plan: albuterol (PROAIR HFA/PROVENTIL HFA/VENTOLIN         HFA) 108 (90 Base) MCG/ACT inhaler          Uses intermittently     (L40.9) Scalp psoriasis  Comment:   Plan: fluocinonide (LIDEX) 0.05 % external solution        Cont to use    (I10) Essential hypertension with goal blood  "pressure less than 140/90  Comment:   Plan: lisinopril (ZESTRIL) 10 MG tablet, Basic         metabolic panel  (Ca, Cl, CO2, Creat, Gluc, K,         Na, BUN)        Well controlled labs normal     (E04.9) Thyroid enlargement  Comment:   Plan: TSH with free T4 reflex, US Thyroid        Will check no history of  Thyroid isssues     (Z12.31) Encounter for screening mammogram for malignant neoplasm of breast  Comment:   Plan: *MA Screening Digital Bilateral              Patient has been advised of split billing requirements and indicates understanding: Yes  COUNSELING:  Reviewed preventive health counseling, as reflected in patient instructions    Estimated body mass index is 33.3 kg/m  as calculated from the following:    Height as of this encounter: 1.6 m (5' 3\").    Weight as of this encounter: 85.3 kg (188 lb).    Weight management plan: Discussed healthy diet and exercise guidelines    She reports that she has never smoked. She has never used smokeless tobacco.      Counseling Resources:  ATP IV Guidelines  Pooled Cohorts Equation Calculator  Breast Cancer Risk Calculator  BRCA-Related Cancer Risk Assessment: FHS-7 Tool  FRAX Risk Assessment  ICSI Preventive Guidelines  Dietary Guidelines for Americans, 2010  USDA's MyPlate  ASA Prophylaxis  Lung CA Screening    Amber Duncan MD  [facility]  "

## 2021-12-23 ASSESSMENT — ASTHMA QUESTIONNAIRES: ACT_TOTALSCORE: 20

## 2021-12-26 NOTE — RESULT ENCOUNTER NOTE
Piper,  Your lab results were normal/stable. Please feel free to my chart or call the office with questions. Amber Duncan M.D.

## 2022-01-23 ENCOUNTER — HEALTH MAINTENANCE LETTER (OUTPATIENT)
Age: 47
End: 2022-01-23

## 2022-02-09 ENCOUNTER — HOSPITAL ENCOUNTER (OUTPATIENT)
Dept: ULTRASOUND IMAGING | Facility: CLINIC | Age: 47
End: 2022-02-09
Attending: FAMILY MEDICINE
Payer: COMMERCIAL

## 2022-02-09 ENCOUNTER — HOSPITAL ENCOUNTER (OUTPATIENT)
Dept: MAMMOGRAPHY | Facility: CLINIC | Age: 47
End: 2022-02-09
Attending: FAMILY MEDICINE
Payer: COMMERCIAL

## 2022-02-09 DIAGNOSIS — E04.9 THYROID ENLARGEMENT: ICD-10-CM

## 2022-02-09 DIAGNOSIS — Z12.31 ENCOUNTER FOR SCREENING MAMMOGRAM FOR MALIGNANT NEOPLASM OF BREAST: ICD-10-CM

## 2022-02-09 PROCEDURE — 76536 US EXAM OF HEAD AND NECK: CPT

## 2022-02-09 PROCEDURE — 77067 SCR MAMMO BI INCL CAD: CPT

## 2022-03-04 ENCOUNTER — TELEPHONE (OUTPATIENT)
Dept: FAMILY MEDICINE | Facility: CLINIC | Age: 47
End: 2022-03-04
Payer: COMMERCIAL

## 2022-03-04 NOTE — TELEPHONE ENCOUNTER
Reason for Call:  Other     Detailed comments: Pt has concerns with the last few days of dizziness and light headiness for about 2 days.  She is wondering what her next step should be.  No different medications noted    Phone Number Patient can be reached at: Home number on file [phone] (home)    Best Time: any    Can we leave a detailed message on this number? YES    Call taken on 3/4/2022 at 7:49 AM by Sharona Nettles

## 2022-03-04 NOTE — TELEPHONE ENCOUNTER
Patient states she has been having some strange dizziness for the past 2 days when laying down and when getting up. She also feels like her equilibrium is off and just kind of feels a little foggy. She denies any headaches, chest pain, vomiting, or diarrhea. She does not have any other symptoms at this time. Scheduled her for Monday with Lily and advised UC/ER over the weekend. She agrees with this plan.    Makayla Walker RN

## 2022-03-07 ENCOUNTER — OFFICE VISIT (OUTPATIENT)
Dept: FAMILY MEDICINE | Facility: CLINIC | Age: 47
End: 2022-03-07
Payer: COMMERCIAL

## 2022-03-07 VITALS
BODY MASS INDEX: 33.86 KG/M2 | DIASTOLIC BLOOD PRESSURE: 76 MMHG | HEART RATE: 98 BPM | SYSTOLIC BLOOD PRESSURE: 134 MMHG | OXYGEN SATURATION: 98 % | WEIGHT: 191.1 LBS | HEIGHT: 63 IN | TEMPERATURE: 98.1 F | RESPIRATION RATE: 16 BRPM

## 2022-03-07 DIAGNOSIS — N95.1 MENOPAUSAL SYNDROME (HOT FLASHES): ICD-10-CM

## 2022-03-07 DIAGNOSIS — H81.10 BENIGN PAROXYSMAL POSITIONAL VERTIGO, UNSPECIFIED LATERALITY: Primary | ICD-10-CM

## 2022-03-07 DIAGNOSIS — I10 BENIGN ESSENTIAL HYPERTENSION: ICD-10-CM

## 2022-03-07 PROCEDURE — 99213 OFFICE O/P EST LOW 20 MIN: CPT | Performed by: PHYSICIAN ASSISTANT

## 2022-03-07 ASSESSMENT — ASTHMA QUESTIONNAIRES: ACT_TOTALSCORE: 25

## 2022-03-07 NOTE — PATIENT INSTRUCTIONS
Look up Epley maneuvers  Meclizine, saline spray. Can try your afrin a few days    Patient Education     Benign Paroxysmal Positional Vertigo     Your health care provider may move your head in certain ways to treat your BPPV.   Benign paroxysmal positional vertigo (BPPV) is a problem with the inner ear. The inner ear contains the vestibular system. This system is what helps you keep your balance. BPPV causes a feeling of spinning. It is a common problem of the vestibular system.   Understanding the vestibular system  The vestibular system of the ear is made up of very tiny parts. They include the utricle, saccule, and semicircular canals. The utricle is a tiny organ that contains calcium crystals. In some people, the crystals can move into the semicircular canals. When this happens, the system no longer works as it should. This causes BPPV. Benign means it is not life threatening. Paroxysmal means it happens suddenly. Positional means that it happens when you move your head. Vertigo is a feeling of spinning.   What causes BPPV?  Causes include injury to your head or neck. Other problems with the vestibular system may cause BPPV. In many people, the cause of BPPV is not known.   Symptoms of BPPV  You may have repeated feelings of spinning (vertigo). The vertigo usually lasts less than 1 minute. Some movements, such as rolling over in bed, can bring on vertigo.   Diagnosing BPPV  Your primary healthcare provider may diagnose and treat your BPPV. Or you may see an ear, nose, and throat healthcare provider (otolaryngologist). In some cases, you may see a healthcare provider who focuses on the nervous system (neurologist).   The healthcare provider will ask about your symptoms and your medical history. He or she will examine you. You may have hearing and balance tests. As part of the exam, your healthcare provider may have you move your head and body in certain ways. If you have BPPV, the movements can bring on vertigo.  Your provider will also look for abnormal movements of your eyes. You may have other tests to check your vestibular or nervous systems.   Treatment for BPPV  Your healthcare provider may try to move the calcium crystals. This is done by having you move your head and neck in certain ways. This treatment is safe and often works well. You may also be told to do these movements at home. You may still have vertigo for a few weeks. Your healthcare provider will recheck your symptoms, usually in about a month. Special physical therapy may also be part of treatment. In rare cases, surgery may be needed for BPPV that does not go away. Talk with your healthcare provider about whether it is safe for you to drive.   When to call the healthcare provider  Call your healthcare provider right away if you have any of these:    Symptoms that do not go away with treatment    Symptoms that get worse    New symptoms  Tru last reviewed this educational content on 2/1/2020 2000-2021 [employer]. All rights reserved. This information is not intended as a substitute for professional medical care. Always follow your healthcare professional's instructions.

## 2022-03-07 NOTE — PROGRESS NOTES
Assessment & Plan     ASSESSMENT/PLAN:      ICD-10-CM    1. Benign paroxysmal positional vertigo, unspecified laterality  H81.10 Physical Therapy Referral   2. Benign essential hypertension  I10    3. Menopausal syndrome (hot flashes)  N95.1      Discussed pathophysiology, treatment of BPPV. Discussed red flag signs to be seen urgently.  Hypertension: better on second check. Continue current medications. Continue to monitor.  Hot flashes: we discussed briefly risks/benefits of hormonal treatment she is not interested. We also discussed effexor, risks/benefits. She will monitor and consider.    Patient Instructions     Look up Epley maneuvers  Meclizine, saline spray. Can try your afrin a few days  BPPV handout    Return in about 1 week (around 3/14/2022) for with PT.    DARCIE Gibbs [facility] HA Corbin is a 46 year old who presents for the following health issues     History of Present Illness       Hypertension: She presents for follow up of hypertension.  She does not check blood pressure  regularly outside of the clinic. Outpatient blood pressures have not been over 140/90. She follows a low salt diet.   Reason for visit:  Light headedness when getting up  Symptom onset:  3-7 days ago  Symptoms include:  Feeling dizzy when getting up  Symptom intensity:  Moderate  Symptom progression:  Staying the same  Had these symptoms before:  No  What makes it worse:  Laying down and getting up  What makes it better:  NoShe exercises with enough effort to increase her heart rate 30 to 60 minutes per day.  She exercises with enough effort to increase her heart rate 5 days per week.   She is taking medications regularly.     No OTC med use. No changes in hypertension meds.  More vertigo sensation, losing balance. Walks sideways sometimes when getting up from bed. Lasts minutes.  When getting up from sitting or laying down primarily, movements.   Had sinus infection a few weeks ago.  "Completely resolved then dizziness started 2.5 weeks after  No ear pressure or ringing, no hearing loss.  Not lightheaded, no CP, palpitations, etc.    Started getting hot flashes, more the past few weeks. Last year missed one period, now missing more periods. Skin was itching a lot a few weeks ago at the start of the hot flashes.        Review of Systems   Other than noted above, general, HEENT, respiratory, cardiac, MS, and gastrointestinal systems are negative.       Objective    /76   Pulse 98   Temp 98.1  F (36.7  C) (Tympanic)   Resp 16   Ht 1.594 m (5' 2.76\")   Wt 86.7 kg (191 lb 1.6 oz)   LMP 01/26/2022 (Approximate)   SpO2 98%   BMI 34.12 kg/m    Body mass index is 34.12 kg/m .  Physical Exam   GENERAL: healthy, alert and no distress  EYES: Eyes grossly normal to inspection, PERRL and conjunctivae and sclerae normal  HENT: ear canals and TM's normal, nose and mouth without ulcers or lesions  NECK: no adenopathy, no asymmetry, masses, or scars and thyroid normal to palpation  RESP: lungs clear to auscultation - no rales, rhonchi or wheezes  CV: regular rate and rhythm, normal S1 S2, no S3 or S4, no murmur, click or rub, no peripheral edema and peripheral pulses strong  ABDOMEN: soft, nontender, no hepatosplenomegaly, no masses and bowel sounds normal  MS: no gross musculoskeletal defects noted, no edema  SKIN: no suspicious lesions or rashes  PSYCH: mentation appears normal, affect normal/bright  NEURO: Normal strength and tone, sensory exam grossly normal, mentation intact and speech normal. Reflexes equal and symmetric.  CN 2-12 grossly intact. Romberg negative. Heel/toe walk normal. Alternating movements, heel to shin normal, proprioception normal. PERRLA, EOMs intact.   ania hallpike left POSITIVE         "

## 2022-03-08 ENCOUNTER — THERAPY VISIT (OUTPATIENT)
Dept: PHYSICAL THERAPY | Facility: CLINIC | Age: 47
End: 2022-03-08
Payer: COMMERCIAL

## 2022-03-08 DIAGNOSIS — H81.12 BENIGN PAROXYSMAL POSITIONAL VERTIGO, LEFT: ICD-10-CM

## 2022-03-08 DIAGNOSIS — H81.10 BENIGN PAROXYSMAL POSITIONAL VERTIGO, UNSPECIFIED LATERALITY: ICD-10-CM

## 2022-03-08 PROCEDURE — 95992 CANALITH REPOSITIONING PROC: CPT | Mod: GP | Performed by: PHYSICAL THERAPIST

## 2022-03-08 PROCEDURE — 97161 PT EVAL LOW COMPLEX 20 MIN: CPT | Mod: GP | Performed by: PHYSICAL THERAPIST

## 2022-03-21 PROBLEM — H81.12 BENIGN PAROXYSMAL POSITIONAL VERTIGO, LEFT: Status: RESOLVED | Noted: 2022-03-08 | Resolved: 2022-03-21

## 2022-06-15 DIAGNOSIS — L40.9 SCALP PSORIASIS: ICD-10-CM

## 2022-06-15 RX ORDER — FLUOCINONIDE TOPICAL SOLUTION USP, 0.05% 0.5 MG/ML
SOLUTION TOPICAL
Qty: 60 ML | Refills: 1 | Status: SHIPPED | OUTPATIENT
Start: 2022-06-15 | End: 2023-05-31

## 2022-06-15 NOTE — TELEPHONE ENCOUNTER
Routing refill request to provider for review/approval because:  Drug not on the [facility] refill protocol   Thank you.  Geoff Tim RN

## 2022-09-10 ENCOUNTER — HEALTH MAINTENANCE LETTER (OUTPATIENT)
Age: 47
End: 2022-09-10

## 2022-11-12 ENCOUNTER — E-VISIT (OUTPATIENT)
Dept: URGENT CARE | Facility: CLINIC | Age: 47
End: 2022-11-12
Payer: COMMERCIAL

## 2022-11-12 DIAGNOSIS — N39.0 ACUTE UTI (URINARY TRACT INFECTION): Primary | ICD-10-CM

## 2022-11-12 PROCEDURE — 99421 OL DIG E/M SVC 5-10 MIN: CPT | Performed by: INTERNAL MEDICINE

## 2022-11-12 RX ORDER — NITROFURANTOIN 25; 75 MG/1; MG/1
100 CAPSULE ORAL 2 TIMES DAILY
Qty: 10 CAPSULE | Refills: 0 | Status: SHIPPED | OUTPATIENT
Start: 2022-11-12 | End: 2022-11-17

## 2022-11-12 NOTE — PATIENT INSTRUCTIONS
Dear Emerald Pearl    After reviewing your responses, I've been able to diagnose you with a urinary tract infection, which is a common infection of the bladder with bacteria.  This is not a sexually transmitted infection, though urinating immediately after intercourse can help prevent infections.  Drinking lots of fluids is also helpful to clear your current infection and prevent the next one.      I have sent a prescription for antibiotics to your pharmacy to treat this infection.    It is important that you take all of your prescribed medication even if your symptoms are improving after a few doses.  Taking all of your medicine helps prevent the symptoms from returning.     If your symptoms worsen, you develop pain in your back or stomach, develop fevers, or are not improving in 5 days, please contact your primary care provider for an appointment or visit any of our convenient Walk-in or Urgent Care Centers to be seen, which can be found on our website here.    Thanks again for choosing us as your health care partner,    Libia Carlton MD    Urinary Tract Infections in Women  Urinary tract infections (UTIs) are most often caused by bacteria. These bacteria enter the urinary tract. The bacteria may come from inside the body. Or they may travel from the skin outside the rectum or vagina into the urethra. Female anatomy makes it easy for bacteria from the bowel to enter a woman s urinary tract, which is the most common source of UTI. This means women develop UTIs more often than men. Pain in or around the urinary tract is a common UTI symptom. But the only way to know for sure if you have a UTI for the healthcare provider to test your urine. The two tests that may be done are the urinalysis and urine culture.     Types of UTIs    Cystitis. A bladder infection (cystitis) is the most common UTI in women. You may have urgent or frequent need to pee. You may also have pain, burning when you pee, and bloody  urine.    Urethritis. This is an inflamed urethra, which is the tube that carries urine from the bladder to outside the body. You may have lower stomach or back pain. You may also have urgent or frequent need to pee.    Pyelonephritis. This is a kidney infection. If not treated, it can be serious and damage your kidneys. In severe cases, you may need to stay in the hospital. You may have a fever and lower back pain.    Medicines to treat a UTI  Most UTIs are treated with antibiotics. These kill the bacteria. The length of time you need to take them depends on the type of infection. It may be as short as 3 days. If you have repeated UTIs, you may need a low-dose antibiotic for several months. Take antibiotics exactly as directed. Don t stop taking them until all of the medicine is gone. If you stop taking the antibiotic too soon, the infection may not go away. You may also develop a resistance to the antibiotic. This can make it much harder to treat.   Lifestyle changes to treat and prevent UTIs   The lifestyle changes below will help get rid of your UTI. They may also help prevent future UTIs.     Drink plenty of fluids. This includes water, juice, or other caffeine-free drinks. Fluids help flush bacteria out of your body.    Empty your bladder. Always empty your bladder when you feel the urge to pee. And always pee before going to sleep. Urine that stays in your bladder can lead to infection. Try to pee before and after sex as well.    Practice good personal hygiene. Wipe yourself from front to back after using the toilet. This helps keep bacteria from getting into the urethra.    Use condoms during sex. These help prevent UTIs caused by sexually transmitted bacteria. Also don't use spermicides during sex. These can increase the risk for UTIs. Choose other forms of birth control instead. For women who tend to get UTIs after sex, a low-dose of a preventive antibiotic may be used. Be sure to discuss this option with  your healthcare provider.    Follow up with your healthcare provider as directed. He or she may test to make sure the infection has cleared. If needed, more treatment may be started.  Tru last reviewed this educational content on 7/1/2019 2000-2021 [employer]. All rights reserved. This information is not intended as a substitute for professional medical care. Always follow your healthcare professional's instructions.

## 2022-11-23 DIAGNOSIS — J30.2 CHRONIC SEASONAL ALLERGIC RHINITIS: ICD-10-CM

## 2022-11-25 RX ORDER — FLUTICASONE PROPIONATE 50 MCG
SPRAY, SUSPENSION (ML) NASAL
Qty: 48 ML | Refills: 1 | Status: SHIPPED | OUTPATIENT
Start: 2022-11-25 | End: 2023-05-31

## 2023-04-30 ENCOUNTER — HEALTH MAINTENANCE LETTER (OUTPATIENT)
Age: 48
End: 2023-04-30

## 2023-05-31 ENCOUNTER — OFFICE VISIT (OUTPATIENT)
Dept: FAMILY MEDICINE | Facility: CLINIC | Age: 48
End: 2023-05-31
Payer: COMMERCIAL

## 2023-05-31 ENCOUNTER — LAB (OUTPATIENT)
Dept: FAMILY MEDICINE | Facility: CLINIC | Age: 48
End: 2023-05-31

## 2023-05-31 ENCOUNTER — LAB (OUTPATIENT)
Dept: LAB | Facility: CLINIC | Age: 48
End: 2023-05-31
Payer: COMMERCIAL

## 2023-05-31 VITALS
WEIGHT: 205.9 LBS | TEMPERATURE: 98.7 F | OXYGEN SATURATION: 97 % | DIASTOLIC BLOOD PRESSURE: 86 MMHG | HEART RATE: 102 BPM | RESPIRATION RATE: 16 BRPM | BODY MASS INDEX: 36.48 KG/M2 | SYSTOLIC BLOOD PRESSURE: 138 MMHG | HEIGHT: 63 IN

## 2023-05-31 DIAGNOSIS — J30.2 CHRONIC SEASONAL ALLERGIC RHINITIS: ICD-10-CM

## 2023-05-31 DIAGNOSIS — Z13.220 SCREENING FOR LIPOID DISORDERS: ICD-10-CM

## 2023-05-31 DIAGNOSIS — Z12.11 SCREEN FOR COLON CANCER: ICD-10-CM

## 2023-05-31 DIAGNOSIS — I10 ESSENTIAL HYPERTENSION WITH GOAL BLOOD PRESSURE LESS THAN 140/90: ICD-10-CM

## 2023-05-31 DIAGNOSIS — I10 BENIGN ESSENTIAL HYPERTENSION: ICD-10-CM

## 2023-05-31 DIAGNOSIS — N95.1 PERIMENOPAUSAL: ICD-10-CM

## 2023-05-31 DIAGNOSIS — L40.9 SCALP PSORIASIS: ICD-10-CM

## 2023-05-31 DIAGNOSIS — E66.01 MORBID OBESITY (H): ICD-10-CM

## 2023-05-31 DIAGNOSIS — Z11.59 NEED FOR HEPATITIS C SCREENING TEST: ICD-10-CM

## 2023-05-31 DIAGNOSIS — Z12.4 SCREENING FOR MALIGNANT NEOPLASM OF CERVIX: ICD-10-CM

## 2023-05-31 DIAGNOSIS — Z12.31 VISIT FOR SCREENING MAMMOGRAM: ICD-10-CM

## 2023-05-31 DIAGNOSIS — B02.9 HERPES ZOSTER WITHOUT COMPLICATION: ICD-10-CM

## 2023-05-31 DIAGNOSIS — E04.9 THYROID ENLARGEMENT: ICD-10-CM

## 2023-05-31 DIAGNOSIS — R35.0 URINARY FREQUENCY: ICD-10-CM

## 2023-05-31 DIAGNOSIS — J45.20 MILD INTERMITTENT ASTHMA WITHOUT COMPLICATION: ICD-10-CM

## 2023-05-31 DIAGNOSIS — Z00.00 ROUTINE GENERAL MEDICAL EXAMINATION AT A HEALTH CARE FACILITY: Primary | ICD-10-CM

## 2023-05-31 LAB
ALBUMIN UR-MCNC: NEGATIVE MG/DL
ANION GAP SERPL CALCULATED.3IONS-SCNC: 10 MMOL/L (ref 7–15)
APPEARANCE UR: CLEAR
BILIRUB UR QL STRIP: NEGATIVE
BUN SERPL-MCNC: 11.5 MG/DL (ref 6–20)
CALCIUM SERPL-MCNC: 9.2 MG/DL (ref 8.6–10)
CHLORIDE SERPL-SCNC: 104 MMOL/L (ref 98–107)
CHOLEST SERPL-MCNC: 196 MG/DL
COLOR UR AUTO: YELLOW
CREAT SERPL-MCNC: 0.84 MG/DL (ref 0.51–0.95)
DEPRECATED HCO3 PLAS-SCNC: 26 MMOL/L (ref 22–29)
GFR SERPL CREATININE-BSD FRML MDRD: 84 ML/MIN/1.73M2
GLUCOSE SERPL-MCNC: 100 MG/DL (ref 70–99)
GLUCOSE UR STRIP-MCNC: NEGATIVE MG/DL
HDLC SERPL-MCNC: 71 MG/DL
HGB UR QL STRIP: NEGATIVE
KETONES UR STRIP-MCNC: NEGATIVE MG/DL
LDLC SERPL CALC-MCNC: 109 MG/DL
LEUKOCYTE ESTERASE UR QL STRIP: NEGATIVE
NITRATE UR QL: NEGATIVE
NONHDLC SERPL-MCNC: 125 MG/DL
PH UR STRIP: 7 [PH] (ref 5–7)
POTASSIUM SERPL-SCNC: 4.4 MMOL/L (ref 3.4–5.3)
SODIUM SERPL-SCNC: 140 MMOL/L (ref 136–145)
SP GR UR STRIP: 1.02 (ref 1–1.03)
TRIGL SERPL-MCNC: 80 MG/DL
TSH SERPL DL<=0.005 MIU/L-ACNC: 1.55 UIU/ML (ref 0.3–4.2)
UROBILINOGEN UR STRIP-ACNC: 0.2 E.U./DL

## 2023-05-31 PROCEDURE — 80061 LIPID PANEL: CPT

## 2023-05-31 PROCEDURE — 86803 HEPATITIS C AB TEST: CPT

## 2023-05-31 PROCEDURE — 99213 OFFICE O/P EST LOW 20 MIN: CPT | Mod: 25 | Performed by: PHYSICIAN ASSISTANT

## 2023-05-31 PROCEDURE — 36415 COLL VENOUS BLD VENIPUNCTURE: CPT

## 2023-05-31 PROCEDURE — G0145 SCR C/V CYTO,THINLAYER,RESCR: HCPCS | Performed by: PHYSICIAN ASSISTANT

## 2023-05-31 PROCEDURE — 84443 ASSAY THYROID STIM HORMONE: CPT

## 2023-05-31 PROCEDURE — 87624 HPV HI-RISK TYP POOLED RSLT: CPT | Performed by: PHYSICIAN ASSISTANT

## 2023-05-31 PROCEDURE — 80048 BASIC METABOLIC PNL TOTAL CA: CPT

## 2023-05-31 PROCEDURE — 99396 PREV VISIT EST AGE 40-64: CPT | Performed by: PHYSICIAN ASSISTANT

## 2023-05-31 PROCEDURE — 81003 URINALYSIS AUTO W/O SCOPE: CPT | Performed by: PHYSICIAN ASSISTANT

## 2023-05-31 RX ORDER — FLUOCINONIDE TOPICAL SOLUTION USP, 0.05% 0.5 MG/ML
SOLUTION TOPICAL
Qty: 60 ML | Refills: 1 | Status: SHIPPED | OUTPATIENT
Start: 2023-05-31

## 2023-05-31 RX ORDER — UREA 40 %
CREAM (GRAM) TOPICAL
COMMUNITY
Start: 2023-05-16

## 2023-05-31 RX ORDER — ALBUTEROL SULFATE 90 UG/1
AEROSOL, METERED RESPIRATORY (INHALATION)
Qty: 18 G | Refills: 1 | Status: SHIPPED | OUTPATIENT
Start: 2023-05-31

## 2023-05-31 RX ORDER — LISINOPRIL 20 MG/1
20 TABLET ORAL DAILY
Qty: 90 TABLET | Refills: 1 | Status: SHIPPED | OUTPATIENT
Start: 2023-05-31 | End: 2023-10-06

## 2023-05-31 RX ORDER — FLUTICASONE PROPIONATE 50 MCG
1-2 SPRAY, SUSPENSION (ML) NASAL DAILY
Qty: 48 ML | Refills: 1 | Status: SHIPPED | OUTPATIENT
Start: 2023-05-31

## 2023-05-31 ASSESSMENT — ENCOUNTER SYMPTOMS
SORE THROAT: 0
PARESTHESIAS: 0
WEAKNESS: 0
ARTHRALGIAS: 0
CHILLS: 0
HEMATURIA: 0
SHORTNESS OF BREATH: 0
JOINT SWELLING: 0
HEMATOCHEZIA: 0
DIZZINESS: 0
EYE PAIN: 0
DIARRHEA: 0
PALPITATIONS: 0
NERVOUS/ANXIOUS: 0
COUGH: 0
FEVER: 0
HEADACHES: 0
HEARTBURN: 0
FREQUENCY: 0
MYALGIAS: 0
ABDOMINAL PAIN: 0
DYSURIA: 0
CONSTIPATION: 0
NAUSEA: 0

## 2023-05-31 ASSESSMENT — ASTHMA QUESTIONNAIRES
QUESTION_4 LAST FOUR WEEKS HOW OFTEN HAVE YOU USED YOUR RESCUE INHALER OR NEBULIZER MEDICATION (SUCH AS ALBUTEROL): NOT AT ALL
QUESTION_1 LAST FOUR WEEKS HOW MUCH OF THE TIME DID YOUR ASTHMA KEEP YOU FROM GETTING AS MUCH DONE AT WORK, SCHOOL OR AT HOME: NONE OF THE TIME
ACT_TOTALSCORE: 25
ACT_TOTALSCORE: 25
QUESTION_2 LAST FOUR WEEKS HOW OFTEN HAVE YOU HAD SHORTNESS OF BREATH: NOT AT ALL
QUESTION_3 LAST FOUR WEEKS HOW OFTEN DID YOUR ASTHMA SYMPTOMS (WHEEZING, COUGHING, SHORTNESS OF BREATH, CHEST TIGHTNESS OR PAIN) WAKE YOU UP AT NIGHT OR EARLIER THAN USUAL IN THE MORNING: NOT AT ALL
QUESTION_5 LAST FOUR WEEKS HOW WOULD YOU RATE YOUR ASTHMA CONTROL: COMPLETELY CONTROLLED

## 2023-05-31 NOTE — PROGRESS NOTES
SUBJECTIVE:   CC: Emerald is an 48 year old who presents for preventive health visit.        View : No data to display.              Healthy Habits:     Getting at least 3 servings of Calcium per day:  Yes    Bi-annual eye exam:  Yes    Dental care twice a year:  Yes    Sleep apnea or symptoms of sleep apnea:  None    Diet:  Regular (no restrictions)    Frequency of exercise:  4-5 days/week    Duration of exercise:  45-60 minutes    Taking medications regularly:  Yes    Medication side effects:  None    PHQ-2 Total Score: 0    Additional concerns today:  Yes    *  Spots on her back that she would like to have looked at, her  had noticed them    * hypertension: blood pressure normal at other checks. Normal at other checks.    * asthma triggered by cold weather or viral illness    * 6 months since last period, then had one in March  More tired, not sleeping as well, some weight gain. Had hot flashes for a while but those resolved  No concerns with sleep apnea - no snoring, apneic spells.  Stress with work    * urinary frequency, gradual. No nocturia.  2 previous pregnancies/vaginal deliveries.  No UTI symptoms.    Social History     Tobacco Use     Smoking status: Never     Smokeless tobacco: Never   Vaping Use     Vaping status: Never Used   Substance Use Topics     Alcohol use: Yes     Alcohol/week: 0.0 standard drinks of alcohol     Comment: occ             5/31/2023     1:40 PM   Alcohol Use   Prescreen: >3 drinks/day or >7 drinks/week? No          View : No data to display.              Reviewed orders with patient.  Reviewed health maintenance and updated orders accordingly - Yes  Lab work is in process  Labs reviewed in EPIC  BP Readings from Last 3 Encounters:   05/31/23 138/86   03/07/22 134/76   12/22/21 136/86    Wt Readings from Last 3 Encounters:   05/31/23 93.4 kg (205 lb 14.4 oz)   03/07/22 86.7 kg (191 lb 1.6 oz)   12/22/21 85.3 kg (188 lb)                  Patient Active Problem List    Diagnosis     Scalp psoriasis     Mild intermittent asthma     Essential hypertension with goal blood pressure less than 140/90     Morbid obesity (H)     Past Surgical History:   Procedure Laterality Date     NO HISTORY OF SURGERY         Social History     Tobacco Use     Smoking status: Never     Smokeless tobacco: Never   Vaping Use     Vaping status: Never Used   Substance Use Topics     Alcohol use: Yes     Alcohol/week: 0.0 standard drinks of alcohol     Comment: occ     Family History   Problem Relation Age of Onset     Hypertension Mother      Lipids Mother      Fuch's dystrophy Mother         Transplant in one eye unsure of side it was on     Blood Disease Father         leukemia     Hypertension Father      Lipids Father      Hypertension Paternal Grandfather      Cerebrovascular Disease Paternal Grandfather      Muscular Dystrophy Son         mild.  has too     Muscular Dystrophy Son         mild.  has too     Breast Cancer Maternal Aunt 60     Breast Cancer Other      Ovarian Cancer No family hx of      Colon Cancer No family hx of            Breast Cancer Screening:  Any new diagnosis of family breast, ovarian, or bowel cancer? No    FHS-7:       12/22/2021     4:11 PM 2/9/2022     5:24 PM 5/31/2023     1:41 PM   Breast CA Risk Assessment (FHS-7)   Did any of your first-degree relatives have breast or ovarian cancer? No No No   Did any of your relatives have bilateral breast cancer? No  No   Did any man in your family have breast cancer? No  Yes   Did any woman in your family have breast and ovarian cancer? Yes  No   Did any woman in your family have breast cancer before age 50 y? No  No   Do you have 2 or more relatives with breast and/or ovarian cancer? No  No   Do you have 2 or more relatives with breast and/or bowel cancer? No  No   * answer is no, no family history of ovarian cancer, no male breast cancer    Mammogram Screening: Recommended annual mammography  Pertinent mammograms  "are reviewed under the imaging tab.    History of abnormal Pap smear: NO - age 30-65 PAP every 5 years with negative HPV co-testing recommended      Latest Ref Rng & Units 12/12/2018     4:33 PM 10/26/2015     6:29 PM 10/26/2015    12:00 AM   PAP / HPV   PAP (Historical)  NIL    NIL     HPV 16 DNA NEG^Negative Negative   Negative      HPV 18 DNA NEG^Negative Negative   Negative      Other HR HPV NEG^Negative Negative   Negative        Reviewed and updated as needed this visit by clinical staff   Tobacco  Allergies  Meds   Med Hx  Surg Hx  Fam Hx  Soc Hx        Reviewed and updated as needed this visit by Provider                 Past Medical History:   Diagnosis Date     Asthma      Fuchs' corneal dystrophy      Hypertension      Seasonal allergies       Past Surgical History:   Procedure Laterality Date     NO HISTORY OF SURGERY         Review of Systems   Constitutional: Negative for chills and fever.   HENT: Negative for congestion, ear pain, hearing loss and sore throat.    Eyes: Negative for pain and visual disturbance.   Respiratory: Negative for cough and shortness of breath.    Cardiovascular: Negative for chest pain, palpitations and peripheral edema.   Gastrointestinal: Negative for abdominal pain, constipation, diarrhea, heartburn, hematochezia and nausea.   Genitourinary: Negative for dysuria, frequency, genital sores, hematuria and urgency.   Musculoskeletal: Negative for arthralgias, joint swelling and myalgias.   Skin: Negative for rash.   Neurological: Negative for dizziness, weakness, headaches and paresthesias.   Psychiatric/Behavioral: Negative for mood changes. The patient is not nervous/anxious.       OBJECTIVE:   BP (!) 138/90   Pulse 102   Temp 98.7  F (37.1  C) (Tympanic)   Resp 16   Ht 1.593 m (5' 2.72\")   Wt 93.4 kg (205 lb 14.4 oz)   LMP 03/27/2023 (Approximate)   SpO2 97%   BMI 36.80 kg/m    Physical Exam  GENERAL: healthy, alert and no distress  EYES: Eyes grossly normal to " inspection, PERRL and conjunctivae and sclerae normal  HENT: ear canals and TM's normal, nose and mouth without ulcers or lesions  NECK: no adenopathy, no asymmetry, masses, or scars and thyroid normal to palpation  RESP: lungs clear to auscultation - no rales, rhonchi or wheezes  BREAST: normal without masses, tenderness or nipple discharge and no palpable axillary masses or adenopathy  CV: regular rate and rhythm, normal S1 S2, no S3 or S4, no murmur, click or rub, no peripheral edema and peripheral pulses strong  ABDOMEN: soft, nontender, no hepatosplenomegaly, no masses and bowel sounds normal  MS: no gross musculoskeletal defects noted, no edema   (female): normal cervix, adnexae, and uterus without masses or discharge   SKIN: POSITIVE left lower back around L3 dermatome shows cluster of  About 8 blister like lesions with surrounding erythema. No other rash.  NEURO: Normal strength and tone, mentation intact and speech normal  BACK: no CVA tenderness, no paralumbar tenderness  PSYCH: mentation appears normal, affect normal/bright  LYMPH: no cervical, supraclavicular, axillary, or inguinal adenopathy    Diagnostic Test Results:  Labs reviewed in Epic    ASSESSMENT/PLAN:     ASSESSMENT/PLAN:      ICD-10-CM    1. Routine general medical examination at a health care facility  Z00.00       2. Essential hypertension with goal blood pressure less than 140/90  I10 lisinopril (ZESTRIL) 20 MG tablet      3. Chronic seasonal allergic rhinitis  J30.2 fluticasone (FLONASE) 50 MCG/ACT nasal spray      4. Scalp psoriasis  L40.9 fluocinonide (LIDEX) 0.05 % external solution      5. Mild intermittent asthma without complication  J45.20 albuterol (PROAIR HFA/PROVENTIL HFA/VENTOLIN HFA) 108 (90 Base) MCG/ACT inhaler      6. Urinary frequency  R35.0 UA Macroscopic with reflex to Microscopic and Culture      7. Screening for malignant neoplasm of cervix  Z12.4 Pap screen with HPV - recommended age 30 - 65 years      8. Screen for  colon cancer  Z12.11 COLGOKUL([employer])      9. Visit for screening mammogram  Z12.31 MA SCREENING DIGITAL BILAT - Future  (s+30)     *MA Screening Digital Bilateral      10. Morbid obesity (H)  E66.01       11. Herpes zoster without complication  B02.9       12. Perimenopausal  N95.1         Hypertension:   Increase lisinopril to 20 mg   Recheck basic metabolic panel in 1 month  Repeat blood pressure check    Perimenopause: Discussed. Discussed symptomatic measures.   Monitor menses.    Obesity: weight gain. check thyroid.   Patient would like to trial more intentional lifestyle changes, rejoin weight watcher, watch calories. If this does not help she is interested in weight management clinic.    Rash: suspect zoster. Present for 2 weeks, a little itchy. Monitor. Discussed no treatment at this point.  Recommended still get zoster vaccine in future.    Patient would like PAP smear today.    Urinary frequency: gave patient handouts on bladder fitness/frequency, she will consider pelvic floor therapy if not improving.        COUNSELING:  Reviewed preventive health counseling, as reflected in patient instructions       Regular exercise       Healthy diet/nutrition       Colorectal Cancer Screening        She reports that she has never smoked. She has never used smokeless tobacco.      DARCIE Gibbs [facility]

## 2023-05-31 NOTE — PATIENT INSTRUCTIONS
Increase lisinopril to 20 mg   Recheck basic metabolic panel in 1 month  Repeat blood pressure check    Preventive Health Recommendations  Female Ages 50 - 64    Yearly exam: See your health care provider every year in order to  Review health changes.   Discuss preventive care.    Review your medicines if your doctor has prescribed any.    Get a Pap test every three years (unless you have an abnormal result and your provider advises testing more often).  If you get Pap tests with HPV test, you only need to test every 5 years, unless you have an abnormal result.   You do not need a Pap test if your uterus was removed (hysterectomy) and you have not had cancer.  You should be tested each year for STDs (sexually transmitted diseases) if you're at risk.   Have a mammogram every 1 to 2 years.  Have a colonoscopy at age 50, or have a yearly FIT test (stool test). These exams screen for colon cancer.    Have a cholesterol test every 5 years, or more often if advised.  Have a diabetes test (fasting glucose) every three years. If you are at risk for diabetes, you should have this test more often.   If you are at risk for osteoporosis (brittle bone disease), think about having a bone density scan (DEXA).    Shots: Get a flu shot each year. Get a tetanus shot every 10 years.    Nutrition:   Eat at least 5 servings of fruits and vegetables each day.  Eat whole-grain bread, whole-wheat pasta and brown rice instead of white grains and rice.  Get adequate Calcium and Vitamin D.     Lifestyle  Exercise at least 150 minutes a week (30 minutes a day, 5 days a week). This will help you control your weight and prevent disease.  Limit alcohol to one drink per day.  No smoking.   Wear sunscreen to prevent skin cancer.   See your dentist every six months for an exam and cleaning.  See your eye doctor every 1 to 2 years.

## 2023-06-01 LAB — HCV AB SERPL QL IA: NONREACTIVE

## 2023-06-05 LAB
BKR LAB AP GYN ADEQUACY: NORMAL
BKR LAB AP GYN INTERPRETATION: NORMAL
BKR LAB AP HPV REFLEX: NORMAL
BKR LAB AP LMP: NORMAL
BKR LAB AP PREVIOUS ABNORMAL: NORMAL
PATH REPORT.COMMENTS IMP SPEC: NORMAL
PATH REPORT.COMMENTS IMP SPEC: NORMAL
PATH REPORT.RELEVANT HX SPEC: NORMAL

## 2023-06-06 LAB
HUMAN PAPILLOMA VIRUS 16 DNA: NEGATIVE
HUMAN PAPILLOMA VIRUS 18 DNA: NEGATIVE
HUMAN PAPILLOMA VIRUS FINAL DIAGNOSIS: NORMAL
HUMAN PAPILLOMA VIRUS OTHER HR: NEGATIVE

## 2023-10-06 ENCOUNTER — OFFICE VISIT (OUTPATIENT)
Dept: FAMILY MEDICINE | Facility: CLINIC | Age: 48
End: 2023-10-06
Payer: COMMERCIAL

## 2023-10-06 VITALS
OXYGEN SATURATION: 96 % | RESPIRATION RATE: 16 BRPM | SYSTOLIC BLOOD PRESSURE: 115 MMHG | HEART RATE: 98 BPM | WEIGHT: 205.2 LBS | BODY MASS INDEX: 36.36 KG/M2 | HEIGHT: 63 IN | TEMPERATURE: 98.6 F | DIASTOLIC BLOOD PRESSURE: 80 MMHG

## 2023-10-06 DIAGNOSIS — N95.1 MENOPAUSAL SYNDROME (HOT FLASHES): ICD-10-CM

## 2023-10-06 DIAGNOSIS — N95.1 MENOPAUSAL SYNDROME (HOT FLASHES): Primary | ICD-10-CM

## 2023-10-06 DIAGNOSIS — I10 ESSENTIAL HYPERTENSION WITH GOAL BLOOD PRESSURE LESS THAN 140/90: ICD-10-CM

## 2023-10-06 LAB
ANION GAP SERPL CALCULATED.3IONS-SCNC: 12 MMOL/L (ref 7–15)
BUN SERPL-MCNC: 11.6 MG/DL (ref 6–20)
CALCIUM SERPL-MCNC: 9.8 MG/DL (ref 8.6–10)
CHLORIDE SERPL-SCNC: 103 MMOL/L (ref 98–107)
CREAT SERPL-MCNC: 0.86 MG/DL (ref 0.51–0.95)
DEPRECATED HCO3 PLAS-SCNC: 25 MMOL/L (ref 22–29)
EGFRCR SERPLBLD CKD-EPI 2021: 82 ML/MIN/1.73M2
GLUCOSE SERPL-MCNC: 99 MG/DL (ref 70–99)
POTASSIUM SERPL-SCNC: 4.5 MMOL/L (ref 3.4–5.3)
SODIUM SERPL-SCNC: 140 MMOL/L (ref 135–145)

## 2023-10-06 PROCEDURE — 36415 COLL VENOUS BLD VENIPUNCTURE: CPT | Performed by: PHYSICIAN ASSISTANT

## 2023-10-06 PROCEDURE — 99213 OFFICE O/P EST LOW 20 MIN: CPT | Performed by: PHYSICIAN ASSISTANT

## 2023-10-06 PROCEDURE — 80048 BASIC METABOLIC PNL TOTAL CA: CPT | Performed by: PHYSICIAN ASSISTANT

## 2023-10-06 RX ORDER — ESCITALOPRAM OXALATE 20 MG/1
20 TABLET ORAL DAILY
Qty: 90 TABLET | Refills: 0 | Status: SHIPPED | OUTPATIENT
Start: 2023-10-06 | End: 2023-11-03

## 2023-10-06 RX ORDER — LISINOPRIL 20 MG/1
20 TABLET ORAL DAILY
Qty: 90 TABLET | Refills: 1 | Status: SHIPPED | OUTPATIENT
Start: 2023-10-06 | End: 2024-05-30

## 2023-10-06 RX ORDER — ESCITALOPRAM OXALATE 10 MG/1
10 TABLET ORAL DAILY
Qty: 14 TABLET | Refills: 0 | Status: SHIPPED | OUTPATIENT
Start: 2023-10-06 | End: 2023-11-06 | Stop reason: DRUGHIGH

## 2023-10-06 RX ORDER — ESCITALOPRAM OXALATE 10 MG/1
TABLET ORAL
Qty: 194 TABLET | Refills: 0 | Status: SHIPPED | OUTPATIENT
Start: 2023-10-06 | End: 2023-10-06

## 2023-10-06 NOTE — PROGRESS NOTES
Assessment & Plan     ASSESSMENT/PLAN:      ICD-10-CM    1. Menopausal syndrome (hot flashes)  N95.1 escitalopram (LEXAPRO) 10 MG tablet      2. Essential hypertension with goal blood pressure less than 140/90  I10 lisinopril (ZESTRIL) 20 MG tablet     Basic metabolic panel  (Ca, Cl, CO2, Creat, Gluc, K, Na, BUN)        We discussed hormonal and nonhormonal options.  Patient would like to start nonhormonal and will consider HRT if symptoms not improving with selective serotonin reuptake inhibitor.  I think she would be a candidate for HRT if interested; would need to monitor blood pressure closely. Discussed risks and benefits of this.  Discussed various SSRIs, including risks and benefits. She elects lexapro.        Discussed that her symptoms of new hot flashes/night sweats at her age are most likely related to menopause. Discussed average age of menopause is 51-52 and that most women will be menopausal by age 55. Discussed definition of menopause is absence of menses for 1 full year, however in women without a uterus we rely on clinical indicators for menopause.    Reviewed options of treatment. Discussed HRT. Discussed this is associated with greatest improvement in hot flashes. Risks of VTE, increased risk of breast cancer with combined HRT.     Discussed goal for HRT is lowest dose to control symptoms for shortest period of time. Discussed lowest risks with HRT are if started within 5 years after menopause. Discussed alternative non-hormonal treatment with SNRI/selective serotonin reuptake inhibitor or gabapentin. Discussed these have been shown to improve vasomotor symptoms. Discussed also that no treatment is acceptable; treatment is guided by how badly she is bothered by symptoms.      Patient does not have contraindications for HRT such as:   history of breast cancer  CHD  a previous venous thromboembolic (VTE) event or stroke  active liver disease  unexplained vaginal bleeding  high-risk endometrial  "cancer  transient ischemic attack/CVD  Migraine with aura:     DARCIE Gibbs [facility] HA Corbin is a 48 year old, presenting for the following health issues:  Menopausal Sx        10/6/2023     9:19 AM   Additional Questions   Roomed by Sherine       History of Present Illness       Reason for visit:  Dealing with symptoms of menopause  Symptom onset:  More than a month  Symptoms include:  Hot flashes memory problems not sleeping not feeling like self  Symptom intensity:  Severe  Symptom progression:  Worsening  Had these symptoms before:  No  What makes it worse:  No  What makes it better:  No    She eats 4 or more servings of fruits and vegetables daily.She consumes 0 sweetened beverage(s) daily.She exercises with enough effort to increase her heart rate 20 to 29 minutes per day.  She exercises with enough effort to increase her heart rate 4 days per week.   She is taking medications regularly.     Has not been herself lately, she is tired all the time, memory problem and hot flashes   Will wait on her flu shot and covid shot  Hypertension: increased lisinopril to 20 mg in May    Mammogram 2/2022  Family history of breast cancer: aunt, great grandmother  LMP: June  Mom benefited from HRT. Multiple family members also on SSRIs    Waking up with hot flashes, not sleeping well. Hot flashes frequently during the day, is affecting her work.    Hypertension: has not checked blood pressure     Has a lot of stress.          Review of Systems   Other than noted above, general, HEENT, respiratory, cardiac, MS, and gastrointestinal systems are negative.       Objective    BP (!) 142/93   Pulse 98   Temp 98.6  F (37  C) (Tympanic)   Resp 16   Ht 1.593 m (5' 2.72\")   Wt 93.1 kg (205 lb 3.2 oz)   SpO2 96%   BMI 36.68 kg/m    Body mass index is 36.68 kg/m .  Physical Exam   GENERAL: healthy, alert and no distress  RESP: lungs clear to auscultation - no rales, rhonchi or " wheezes  CV: regular rate and rhythm, normal S1 S2, no S3 or S4, no murmur, click or rub, no peripheral edema and peripheral pulses strong  MS: no gross musculoskeletal defects noted, no edema  PSYCH: mentation appears normal, affect normal/bright

## 2023-10-06 NOTE — PATIENT INSTRUCTIONS
Menopausal Hormone Therapy  What is menopausal hormone therapy?   Menopausal hormone therapy is a treatment for the symptoms of menopause. Hormones are taken to replace some of the natural hormones that decrease at menopause. The 2 main female hormones are estrogen and progesterone.  Menopause is the time when a woman stops having menstrual periods. It usually starts gradually. The ovaries start making less hormone. Menstrual periods become less regular. After a time, periods stop completely.   Menopause happens suddenly if the ovaries are removed.  Menopause is usually part of a natural aging process. It is not a disease. For many women menopause is an easy change. Some women have problems caused by the decrease in hormones, particularly by the decrease in estrogen. These problems may be helped by treatment that replaces some of the lost hormone.  Taking estrogen alone increases the risk of cancer of the uterus. If you still have your uterus, treatment usually includes both estrogen and progesterone to reduce this risk. If your uterus has been removed, you may take estrogen alone.  Hormone replacement therapy (HRT) may be taken as:  tablets to be swallowed   patches or lotion to be put on the skin   a cream, ring, or tablet put into the vagina   pellets placed under the skin   shots  When is it used?   You may never have symptoms of menopause, or you may have them for a few weeks, for a few months, or sometimes over several years. Your symptoms may come and go. Your healthcare provider might recommend HRT to relieve the following symptoms, especially if they are very severe:  hot flashes   night sweats   vaginal dryness, sometimes causing discomfort or pain during sex   trouble sleeping   loss of sex drive (libido)   mood swings   depression   memory loss   headaches  HRT may be prescribed to treat symptoms of menopause if:  Other treatments are not helping your symptoms enough.   You and your provider decide the  benefits may outweigh the risks.   Factors such as your age, race, family history, and health history will be considered. You and your healthcare provider should discuss the risks and benefits of HRT for you.   Hormone therapy may be recommended for you if you have gone through menopause early (before the age of 40). HRT is often given to younger women who have had their ovaries removed.  What are the benefits of hormone therapy?   Relief of menopausal symptoms, including hot flashes and vaginal dryness   Prevention and treatment of osteoporosis. Osteoporosis is a weakening of the bones that starts around age 35 and makes it easier for the bones to break. However, lifestyle changes and other prescription medicines can also help prevent osteoporosis.   What are the risks of hormone therapy?   The risks of hormone therapy include:  Uterine cancer: Estrogen taken without progesterone increases the risk of cancer of the uterus. To lessen this risk, healthcare providers prescribe estrogen combined with progesterone for women who have not had their uterus removed.   Breast cancer: HRT may increase the risk of breast cancer. Talk to your healthcare provider about this risk. Many providers recommend that women be checked thoroughly for any tumors and have a mammogram before starting HRT. If you have a family history of breast cancer, be sure to discuss this with your provider.   Heart disease, stroke, and blood clots in the legs and lungs.  Hormone therapy may also increase your risk for some gallbladder problems and dementia.  Researchers and healthcare providers are studying the benefits and risks of HRT and learning new things every day. The risks described above may be different for lower dosages of estrogen and progesterone or progesterone only.   What are the side effects of hormone therapy?   The side effects of hormone therapy may include:  bloating, fluid retention, and weight gain   breast tenderness and  enlargement   nausea   symptoms like those of premenstrual tension (PMS), such as headaches and mood swings when progesterone is part of the treatment   abnormal blood clotting.   acne if you are taking estrogen with progesterone   headache   chloasma (tan blotches on your face).  If your treatment is with estrogen only, and you have your uterus, you may get some vaginal bleeding.   If your treatment includes both estrogen and progesterone, you may have some vaginal bleeding if there are days when you are not taking hormones. Not a menstrual period, the bleeding typically lasts 2 or 3 days. Usually you will not have any cramps with the bleeding.   If you take both estrogen and progesterone in low doses every day, the hormones will not cause bleeding except perhaps some spotting of blood for the first 2 to 3 months.  Tell your healthcare provider about any vaginal bleeding you have.  Who should not take hormone therapy?   Hormone therapy is not recommended for women who have any of these conditions, diseases, or medical history:  heart attack or stroke   high blood pressure you cannot control   blood clots in the legs, lungs, brain, or eyes   cancer of the breast or uterus   unexplained vaginal bleeding   liver disease  You should also not take hormones if you are pregnant or think you may be pregnant.  Also, if you smoke, you should avoid hormone therapy. Smoking while you are using this medicine increases the risk of serious side effects such as heart attack, stroke, and blood clots.   If you have any of the following diseases or conditions, you should talk to your provider about the effect of HRT on these conditions:  uterine fibroids (These benign tumors may grow and bleed in response to estrogen. They begin to shrink at menopause unless you are taking estrogen.)   endometriosis   fibrocystic breast disease   migraine headaches   gallbladder disease   high blood pressure   porphyria (nerve pain or sensitivity to  sunlight)  What can I do to take care of myself?   If you are thinking about taking hormones:  Talk to your healthcare provider about the risks and benefits.   Get a mammogram before you start HRT to check for breast cancer.   Ask your healthcare provider about:   the different types and dosages of hormone therapy   any side effects or special precautions you should know about while you are taking hormones   when you should start and stop taking the hormones  If you are already taking hormones:  Ask your healthcare provider about any special precautions or side effects.   If you are taking estrogen combined with progesterone, tell your provider if you have bleeding at any time other than the days when you do not take the hormones.   Don t change your dose without checking with your provider first.   Don t smoke.   Have a mammogram as often as your provider recommends.   Have a complete physical exam as often as your healthcare provider recommends. Your blood should be tested regularly for cholesterol levels and liver function.    Published by [employer].  This content is reviewed periodically and is subject to change as new health information becomes available. The information is intended to inform and educate and is not a replacement for medical evaluation, advice, diagnosis or treatment by a healthcare professional.

## 2023-11-03 ENCOUNTER — MYC REFILL (OUTPATIENT)
Dept: FAMILY MEDICINE | Facility: CLINIC | Age: 48
End: 2023-11-03
Payer: COMMERCIAL

## 2023-11-03 DIAGNOSIS — N95.1 MENOPAUSAL SYNDROME (HOT FLASHES): ICD-10-CM

## 2023-11-03 RX ORDER — ESCITALOPRAM OXALATE 20 MG/1
20 TABLET ORAL DAILY
Qty: 90 TABLET | Refills: 0 | Status: SHIPPED | OUTPATIENT
Start: 2023-11-03 | End: 2023-11-03

## 2023-11-03 NOTE — TELEPHONE ENCOUNTER
"Routing refill request to provider for review/approval because:  Provider to determine refill         Requested Prescriptions   Pending Prescriptions Disp Refills    escitalopram (LEXAPRO) 20 MG tablet [Pharmacy Med Name: ESCITALOPRAM 20 MG TABLET] 90 tablet 0     Sig: TAKE 1 TABLET BY MOUTH EVERY DAY       SSRIs Protocol Passed - 11/3/2023  1:01 PM        Passed - Recent (12 mo) or future (30 days) visit within the authorizing provider's specialty     Patient has had an office visit with the authorizing provider or a provider within the authorizing providers department within the previous 12 mos or has a future within next 30 days. See \"Patient Info\" tab in inbasket, or \"Choose Columns\" in Meds & Orders section of the refill encounter.              Passed - Medication is active on med list        Passed - Patient is age 18 or older        Passed - No active pregnancy on record        Passed - No positive pregnancy test in last 12 months                 Vlad Bull RN 11/03/23 1:12 PM   "

## 2023-11-06 RX ORDER — ESCITALOPRAM OXALATE 5 MG/1
5 TABLET ORAL DAILY
Qty: 90 TABLET | Refills: 0 | Status: SHIPPED | OUTPATIENT
Start: 2023-11-06 | End: 2024-02-02

## 2024-02-02 ENCOUNTER — PATIENT OUTREACH (OUTPATIENT)
Dept: CARE COORDINATION | Facility: CLINIC | Age: 49
End: 2024-02-02
Payer: COMMERCIAL

## 2024-02-02 DIAGNOSIS — N95.1 MENOPAUSAL SYNDROME (HOT FLASHES): ICD-10-CM

## 2024-02-02 RX ORDER — ESCITALOPRAM OXALATE 5 MG/1
5 TABLET ORAL DAILY
Qty: 90 TABLET | Refills: 0 | Status: SHIPPED | OUTPATIENT
Start: 2024-02-02

## 2024-04-28 ENCOUNTER — HEALTH MAINTENANCE LETTER (OUTPATIENT)
Age: 49
End: 2024-04-28

## 2024-05-01 ENCOUNTER — PATIENT OUTREACH (OUTPATIENT)
Dept: CARE COORDINATION | Facility: CLINIC | Age: 49
End: 2024-05-01
Payer: COMMERCIAL

## 2024-05-15 ENCOUNTER — PATIENT OUTREACH (OUTPATIENT)
Dept: CARE COORDINATION | Facility: CLINIC | Age: 49
End: 2024-05-15
Payer: COMMERCIAL

## 2024-05-30 DIAGNOSIS — I10 ESSENTIAL HYPERTENSION WITH GOAL BLOOD PRESSURE LESS THAN 140/90: ICD-10-CM

## 2024-05-30 RX ORDER — LISINOPRIL 20 MG/1
20 TABLET ORAL DAILY
Qty: 90 TABLET | Refills: 1 | Status: SHIPPED | OUTPATIENT
Start: 2024-05-30

## 2024-07-07 ENCOUNTER — HEALTH MAINTENANCE LETTER (OUTPATIENT)
Age: 49
End: 2024-07-07

## 2024-08-07 ENCOUNTER — PATIENT OUTREACH (OUTPATIENT)
Dept: CARE COORDINATION | Facility: CLINIC | Age: 49
End: 2024-08-07
Payer: COMMERCIAL

## 2024-11-21 DIAGNOSIS — I10 ESSENTIAL HYPERTENSION WITH GOAL BLOOD PRESSURE LESS THAN 140/90: ICD-10-CM

## 2024-11-21 RX ORDER — LISINOPRIL 20 MG/1
20 TABLET ORAL DAILY
Qty: 90 TABLET | Refills: 0 | Status: SHIPPED | OUTPATIENT
Start: 2024-11-21

## 2024-11-27 ENCOUNTER — PATIENT OUTREACH (OUTPATIENT)
Dept: CARE COORDINATION | Facility: CLINIC | Age: 49
End: 2024-11-27
Payer: COMMERCIAL

## 2025-02-18 DIAGNOSIS — I10 ESSENTIAL HYPERTENSION WITH GOAL BLOOD PRESSURE LESS THAN 140/90: ICD-10-CM

## 2025-02-18 RX ORDER — LISINOPRIL 20 MG/1
20 TABLET ORAL DAILY
Qty: 30 TABLET | Refills: 0 | Status: SHIPPED | OUTPATIENT
Start: 2025-02-18

## 2025-02-26 ENCOUNTER — ALLIED HEALTH/NURSE VISIT (OUTPATIENT)
Dept: FAMILY MEDICINE | Facility: CLINIC | Age: 50
End: 2025-02-26
Payer: COMMERCIAL

## 2025-02-26 VITALS — SYSTOLIC BLOOD PRESSURE: 156 MMHG | DIASTOLIC BLOOD PRESSURE: 95 MMHG

## 2025-02-26 DIAGNOSIS — Z01.30 BP CHECK: Primary | ICD-10-CM

## 2025-02-26 PROCEDURE — 3077F SYST BP >= 140 MM HG: CPT | Performed by: PHYSICIAN ASSISTANT

## 2025-02-26 PROCEDURE — 3080F DIAST BP >= 90 MM HG: CPT | Performed by: PHYSICIAN ASSISTANT

## 2025-02-26 PROCEDURE — 99207 PR NO CHARGE NURSE ONLY: CPT | Performed by: PHYSICIAN ASSISTANT

## 2025-02-26 NOTE — PROGRESS NOTES
Emerald Pearl was evaluated at [facility] on February 26, 2025 at which time her blood pressure was:    BP Readings from Last 1 Encounters:   02/26/25 (!) 156/95     No data recorded      Reviewed lifestyle modifications for blood pressure control and reduction: including making healthy food choices, managing weight, getting regular exercise, smoking cessation, reducing alcohol consumption, monitoring blood pressure regularly.     Symptoms: None    BP Goal:< 140/90 mmHg    BP Assessment:  BP too high    Potential Reasons for BP too high: NA - Not applicable    BP Follow-Up Plan: Referral to PCP    Recommendation to Provider: Patient states she has a virtual appointment next week so wanted a BP reading to give to the provider. Elevated today, can potentially increase dose of Lisinopril if desired.     Note completed by: Susan Lopez RPH

## 2025-03-09 ASSESSMENT — ASTHMA QUESTIONNAIRES
QUESTION_2 LAST FOUR WEEKS HOW OFTEN HAVE YOU HAD SHORTNESS OF BREATH: NOT AT ALL
ACT_TOTALSCORE: 25
QUESTION_1 LAST FOUR WEEKS HOW MUCH OF THE TIME DID YOUR ASTHMA KEEP YOU FROM GETTING AS MUCH DONE AT WORK, SCHOOL OR AT HOME: NONE OF THE TIME
QUESTION_5 LAST FOUR WEEKS HOW WOULD YOU RATE YOUR ASTHMA CONTROL: COMPLETELY CONTROLLED
ACT_TOTALSCORE: 25
QUESTION_4 LAST FOUR WEEKS HOW OFTEN HAVE YOU USED YOUR RESCUE INHALER OR NEBULIZER MEDICATION (SUCH AS ALBUTEROL): NOT AT ALL
QUESTION_3 LAST FOUR WEEKS HOW OFTEN DID YOUR ASTHMA SYMPTOMS (WHEEZING, COUGHING, SHORTNESS OF BREATH, CHEST TIGHTNESS OR PAIN) WAKE YOU UP AT NIGHT OR EARLIER THAN USUAL IN THE MORNING: NOT AT ALL

## 2025-03-10 ENCOUNTER — VIRTUAL VISIT (OUTPATIENT)
Dept: FAMILY MEDICINE | Facility: CLINIC | Age: 50
End: 2025-03-10
Payer: COMMERCIAL

## 2025-03-10 DIAGNOSIS — J30.2 CHRONIC SEASONAL ALLERGIC RHINITIS: ICD-10-CM

## 2025-03-10 DIAGNOSIS — Z13.220 SCREENING FOR LIPOID DISORDERS: ICD-10-CM

## 2025-03-10 DIAGNOSIS — I10 ESSENTIAL HYPERTENSION WITH GOAL BLOOD PRESSURE LESS THAN 140/90: Primary | ICD-10-CM

## 2025-03-10 DIAGNOSIS — Z12.11 SCREEN FOR COLON CANCER: ICD-10-CM

## 2025-03-10 DIAGNOSIS — Z12.31 VISIT FOR SCREENING MAMMOGRAM: ICD-10-CM

## 2025-03-10 DIAGNOSIS — N95.1 MENOPAUSAL SYNDROME (HOT FLASHES): ICD-10-CM

## 2025-03-10 DIAGNOSIS — L40.9 SCALP PSORIASIS: ICD-10-CM

## 2025-03-10 PROCEDURE — 98005 SYNCH AUDIO-VIDEO EST LOW 20: CPT | Performed by: PHYSICIAN ASSISTANT

## 2025-03-10 RX ORDER — FLUOCINONIDE TOPICAL SOLUTION USP, 0.05% 0.5 MG/ML
SOLUTION TOPICAL
Qty: 60 ML | Refills: 1 | Status: SHIPPED | OUTPATIENT
Start: 2025-03-10

## 2025-03-10 RX ORDER — FLUTICASONE PROPIONATE 50 MCG
1-2 SPRAY, SUSPENSION (ML) NASAL DAILY
Qty: 48 ML | Refills: 1 | Status: SHIPPED | OUTPATIENT
Start: 2025-03-10

## 2025-03-10 RX ORDER — LISINOPRIL 40 MG/1
40 TABLET ORAL DAILY
Qty: 30 TABLET | Refills: 1 | Status: SHIPPED | OUTPATIENT
Start: 2025-03-10

## 2025-03-10 NOTE — PROGRESS NOTES
Emerald is a 49 year old who is being evaluated via a billable video visit.    How would you like to obtain your AVS? MyChart  If the video visit is dropped, the invitation should be resent by: Text to cell phone: [phone]  Will anyone else be joining your video visit? No      Assessment & Plan     Essential hypertension with goal blood pressure less than 140/90  Will increase lisinopril 20 mg to 40 mg. Patient unable to come in for labs now, will recheck labs and blood pressure in 2-3 weeks (she is going on a trip prior).  She has been taking more ibuprofen for dental pain but is weaning off.  She had been on HCTZ before and had urinary issues, she does not want to be on a diuretic again.  Scheduled for preventive exam.    - BASIC METABOLIC PANEL; Future  - lisinopril (ZESTRIL) 40 MG tablet; Take 1 tablet (40 mg) by mouth daily.    Menopausal syndrome (hot flashes)  LMP 3/2024. Worsening hot flashes, insomnia, not feeling like herself.  She used the lexapro for hot flashes last year for over three months with continued GI symptoms so she stopped it. Was helpful though. Consider different SSRI. Did discuss HRT more, handouts given. Will ensure better blood pressure control to lower CVD/stroke risk and consider starting at next visit. Discussed risks and patient is agreeable to plan.    Chronic seasonal allergic rhinitis  Working well, refilled.  - fluticasone (FLONASE) 50 MCG/ACT nasal spray; Spray 1-2 sprays into both nostrils daily.    Visit for screening mammogram  - MA Screening Bilateral w/ Nghia; Future    Screen for colon cancer  - TIMOTEO([employer]); Future    Screening for lipoid disorders  - Lipid panel reflex to direct LDL Fasting; Future    Scalp psoriasis  Works well PRN.  - fluocinonide (LIDEX) 0.05 % external solution; APPLY TO AFFECTED AREA TOPICALLY EVERY DAY        Subjective   Emerald is a 49 year old, presenting for the following health issues:  Recheck Medication        3/10/2025     3:39  PM   Additional Questions   Roomed by Sherine   Accompanied by Self     History of Present Illness       Hypertension: She presents for follow up of hypertension.  She does not check blood pressure  regularly outside of the clinic. Outside blood pressures have been over 140/90. She does not follow a low salt diet.     She eats 4 or more servings of fruits and vegetables daily.She consumes 0 sweetened beverage(s) daily.She exercises with enough effort to increase her heart rate 30 to 60 minutes per day.  She exercises with enough effort to increase her heart rate 3 or less days per week.   She is taking medications regularly.                Review of Systems  Constitutional, HEENT, cardiovascular, pulmonary, GI, , musculoskeletal, neuro, skin, endocrine and psych systems are negative, except as otherwise noted.      Objective           Vitals:  No vitals were obtained today due to virtual visit.    Physical Exam   GENERAL: alert and no distress  EYES: Eyes grossly normal to inspection.  No discharge or erythema, or obvious scleral/conjunctival abnormalities.  RESP: No audible wheeze, cough, or visible cyanosis.    SKIN: Visible skin clear. No significant rash, abnormal pigmentation or lesions.  NEURO: Cranial nerves grossly intact.  Mentation and speech appropriate for age.  PSYCH: Appropriate affect, tone, and pace of words      Video-Visit Details    Type of service:  Video Visit   Originating Location (pt. Location): Home    Distant Location (provider location):  On-site  Platform used for Video Visit: Laurie  Signed Electronically by: Lily Fine PA-C

## 2025-04-02 DIAGNOSIS — I10 ESSENTIAL HYPERTENSION WITH GOAL BLOOD PRESSURE LESS THAN 140/90: ICD-10-CM

## 2025-04-02 RX ORDER — LISINOPRIL 40 MG/1
40 TABLET ORAL DAILY
Qty: 30 TABLET | Refills: 0 | Status: SHIPPED | OUTPATIENT
Start: 2025-04-02

## 2025-04-25 ENCOUNTER — ANCILLARY PROCEDURE (OUTPATIENT)
Dept: MAMMOGRAPHY | Facility: CLINIC | Age: 50
End: 2025-04-25
Attending: PHYSICIAN ASSISTANT
Payer: COMMERCIAL

## 2025-04-25 DIAGNOSIS — Z12.31 VISIT FOR SCREENING MAMMOGRAM: ICD-10-CM

## 2025-04-25 PROCEDURE — 77063 BREAST TOMOSYNTHESIS BI: CPT

## 2025-05-01 ENCOUNTER — PATIENT OUTREACH (OUTPATIENT)
Dept: FAMILY MEDICINE | Facility: CLINIC | Age: 50
End: 2025-05-01
Payer: COMMERCIAL

## 2025-05-01 NOTE — TELEPHONE ENCOUNTER
Patient Quality Outreach    Patient is due for the following:   Hypertension -  BP check  Physical Preventive Adult Physical    Action(s) Taken:   Patient has upcoming appointment, these items will be addressed at that time.    Type of outreach:    Chart review performed, no outreach needed.    Questions for provider review:    None         Sherine Chung, ANANT  Chart routed to None.

## 2025-05-05 ENCOUNTER — OFFICE VISIT (OUTPATIENT)
Dept: FAMILY MEDICINE | Facility: CLINIC | Age: 50
End: 2025-05-05
Payer: COMMERCIAL

## 2025-05-05 VITALS
RESPIRATION RATE: 16 BRPM | SYSTOLIC BLOOD PRESSURE: 132 MMHG | HEIGHT: 63 IN | OXYGEN SATURATION: 98 % | WEIGHT: 215.4 LBS | DIASTOLIC BLOOD PRESSURE: 78 MMHG | BODY MASS INDEX: 38.16 KG/M2 | HEART RATE: 94 BPM | TEMPERATURE: 98.7 F

## 2025-05-05 DIAGNOSIS — Z00.00 ROUTINE GENERAL MEDICAL EXAMINATION AT A HEALTH CARE FACILITY: Primary | ICD-10-CM

## 2025-05-05 DIAGNOSIS — Z13.1 ENCOUNTER FOR SCREENING EXAMINATION FOR IMPAIRED GLUCOSE REGULATION AND DIABETES MELLITUS: ICD-10-CM

## 2025-05-05 DIAGNOSIS — I10 BENIGN ESSENTIAL HYPERTENSION: ICD-10-CM

## 2025-05-05 DIAGNOSIS — J45.20 MILD INTERMITTENT ASTHMA WITHOUT COMPLICATION: ICD-10-CM

## 2025-05-05 DIAGNOSIS — R00.2 PALPITATIONS: ICD-10-CM

## 2025-05-05 DIAGNOSIS — N95.1 MENOPAUSAL SYNDROME (HOT FLASHES): ICD-10-CM

## 2025-05-05 DIAGNOSIS — Z13.220 SCREENING FOR LIPOID DISORDERS: ICD-10-CM

## 2025-05-05 DIAGNOSIS — E66.01 MORBID OBESITY (H): ICD-10-CM

## 2025-05-05 LAB
ALBUMIN SERPL BCG-MCNC: 4.4 G/DL (ref 3.5–5.2)
ALP SERPL-CCNC: 110 U/L (ref 40–150)
ALT SERPL W P-5'-P-CCNC: 39 U/L (ref 0–50)
ANION GAP SERPL CALCULATED.3IONS-SCNC: 12 MMOL/L (ref 7–15)
AST SERPL W P-5'-P-CCNC: 31 U/L (ref 0–45)
BASOPHILS # BLD AUTO: 0 10E3/UL (ref 0–0.2)
BASOPHILS NFR BLD AUTO: 1 %
BILIRUB SERPL-MCNC: 0.2 MG/DL
BUN SERPL-MCNC: 12.6 MG/DL (ref 6–20)
CALCIUM SERPL-MCNC: 9.5 MG/DL (ref 8.8–10.4)
CHLORIDE SERPL-SCNC: 103 MMOL/L (ref 98–107)
CHOLEST SERPL-MCNC: 195 MG/DL
CREAT SERPL-MCNC: 0.93 MG/DL (ref 0.51–0.95)
EGFRCR SERPLBLD CKD-EPI 2021: 74 ML/MIN/1.73M2
EOSINOPHIL # BLD AUTO: 0.2 10E3/UL (ref 0–0.7)
EOSINOPHIL NFR BLD AUTO: 3 %
ERYTHROCYTE [DISTWIDTH] IN BLOOD BY AUTOMATED COUNT: 13 % (ref 10–15)
EST. AVERAGE GLUCOSE BLD GHB EST-MCNC: 114 MG/DL
FASTING STATUS PATIENT QL REPORTED: NO
FASTING STATUS PATIENT QL REPORTED: NO
GLUCOSE SERPL-MCNC: 92 MG/DL (ref 70–99)
HBA1C MFR BLD: 5.6 % (ref 0–5.6)
HCO3 SERPL-SCNC: 25 MMOL/L (ref 22–29)
HCT VFR BLD AUTO: 40.1 % (ref 35–47)
HDLC SERPL-MCNC: 67 MG/DL
HGB BLD-MCNC: 13.2 G/DL (ref 11.7–15.7)
IMM GRANULOCYTES # BLD: 0 10E3/UL
IMM GRANULOCYTES NFR BLD: 0 %
LDLC SERPL CALC-MCNC: 102 MG/DL
LYMPHOCYTES # BLD AUTO: 1.9 10E3/UL (ref 0.8–5.3)
LYMPHOCYTES NFR BLD AUTO: 22 %
MCH RBC QN AUTO: 28 PG (ref 26.5–33)
MCHC RBC AUTO-ENTMCNC: 32.9 G/DL (ref 31.5–36.5)
MCV RBC AUTO: 85 FL (ref 78–100)
MONOCYTES # BLD AUTO: 0.7 10E3/UL (ref 0–1.3)
MONOCYTES NFR BLD AUTO: 8 %
NEUTROPHILS # BLD AUTO: 5.9 10E3/UL (ref 1.6–8.3)
NEUTROPHILS NFR BLD AUTO: 67 %
NONHDLC SERPL-MCNC: 128 MG/DL
PLATELET # BLD AUTO: 268 10E3/UL (ref 150–450)
POTASSIUM SERPL-SCNC: 4 MMOL/L (ref 3.4–5.3)
PROT SERPL-MCNC: 7.3 G/DL (ref 6.4–8.3)
RBC # BLD AUTO: 4.72 10E6/UL (ref 3.8–5.2)
SODIUM SERPL-SCNC: 140 MMOL/L (ref 135–145)
TRIGL SERPL-MCNC: 129 MG/DL
TSH SERPL DL<=0.005 MIU/L-ACNC: 1.39 UIU/ML (ref 0.3–4.2)
WBC # BLD AUTO: 8.7 10E3/UL (ref 4–11)

## 2025-05-05 PROCEDURE — 80053 COMPREHEN METABOLIC PANEL: CPT | Performed by: PHYSICIAN ASSISTANT

## 2025-05-05 PROCEDURE — 99396 PREV VISIT EST AGE 40-64: CPT | Performed by: PHYSICIAN ASSISTANT

## 2025-05-05 PROCEDURE — 80061 LIPID PANEL: CPT | Performed by: PHYSICIAN ASSISTANT

## 2025-05-05 PROCEDURE — 3075F SYST BP GE 130 - 139MM HG: CPT | Performed by: PHYSICIAN ASSISTANT

## 2025-05-05 PROCEDURE — 99214 OFFICE O/P EST MOD 30 MIN: CPT | Mod: 25 | Performed by: PHYSICIAN ASSISTANT

## 2025-05-05 PROCEDURE — 84443 ASSAY THYROID STIM HORMONE: CPT | Performed by: PHYSICIAN ASSISTANT

## 2025-05-05 PROCEDURE — 3078F DIAST BP <80 MM HG: CPT | Performed by: PHYSICIAN ASSISTANT

## 2025-05-05 PROCEDURE — 93000 ELECTROCARDIOGRAM COMPLETE: CPT | Performed by: PHYSICIAN ASSISTANT

## 2025-05-05 PROCEDURE — 83036 HEMOGLOBIN GLYCOSYLATED A1C: CPT | Performed by: PHYSICIAN ASSISTANT

## 2025-05-05 PROCEDURE — 36415 COLL VENOUS BLD VENIPUNCTURE: CPT | Performed by: PHYSICIAN ASSISTANT

## 2025-05-05 PROCEDURE — 85025 COMPLETE CBC W/AUTO DIFF WBC: CPT | Performed by: PHYSICIAN ASSISTANT

## 2025-05-05 RX ORDER — ALBUTEROL SULFATE 90 UG/1
INHALANT RESPIRATORY (INHALATION)
Qty: 18 G | Refills: 1 | Status: SHIPPED | OUTPATIENT
Start: 2025-05-05

## 2025-05-05 RX ORDER — LISINOPRIL 20 MG/1
20 TABLET ORAL DAILY
Qty: 90 TABLET | Refills: 3 | Status: SHIPPED | OUTPATIENT
Start: 2025-05-05

## 2025-05-05 SDOH — HEALTH STABILITY: PHYSICAL HEALTH: ON AVERAGE, HOW MANY DAYS PER WEEK DO YOU ENGAGE IN MODERATE TO STRENUOUS EXERCISE (LIKE A BRISK WALK)?: 4 DAYS

## 2025-05-05 ASSESSMENT — ASTHMA QUESTIONNAIRES
QUESTION_4 LAST FOUR WEEKS HOW OFTEN HAVE YOU USED YOUR RESCUE INHALER OR NEBULIZER MEDICATION (SUCH AS ALBUTEROL): NOT AT ALL
QUESTION_2 LAST FOUR WEEKS HOW OFTEN HAVE YOU HAD SHORTNESS OF BREATH: NOT AT ALL
QUESTION_5 LAST FOUR WEEKS HOW WOULD YOU RATE YOUR ASTHMA CONTROL: COMPLETELY CONTROLLED
QUESTION_3 LAST FOUR WEEKS HOW OFTEN DID YOUR ASTHMA SYMPTOMS (WHEEZING, COUGHING, SHORTNESS OF BREATH, CHEST TIGHTNESS OR PAIN) WAKE YOU UP AT NIGHT OR EARLIER THAN USUAL IN THE MORNING: NOT AT ALL
ACT_TOTALSCORE: 25
QUESTION_1 LAST FOUR WEEKS HOW MUCH OF THE TIME DID YOUR ASTHMA KEEP YOU FROM GETTING AS MUCH DONE AT WORK, SCHOOL OR AT HOME: NONE OF THE TIME

## 2025-05-05 ASSESSMENT — SOCIAL DETERMINANTS OF HEALTH (SDOH): HOW OFTEN DO YOU GET TOGETHER WITH FRIENDS OR RELATIVES?: MORE THAN THREE TIMES A WEEK

## 2025-05-05 NOTE — PROGRESS NOTES
"Preventive Care Visit  [facility] HA Fine PA-C, Family Medicine  May 5, 2025      Assessment & Plan     Routine general medical examination at a health care facility  Discussed routine health maintenance and lifestyle recommendations including diet and exercise recommendations.  Appropriate preventive services were discussed with this patient, including applicable screening as appropriate for cardiovascular disease, diabetes, osteopenia/osteoporosis, and glaucoma.  As appropriate for age/gender, discussed screening for colorectal cancer, prostate cancer, breast cancer, and cervical cancer. Discussed applicable vaccinations and recommended labwork.  Checklist reviewing preventive services available has been given to the patient in preventive handout.     Declines vaccinations today.  Has cologuard kit at home, will send in    Palpitations  Increased dose of lisinopril in March from 20 mg to 40 mg.  Patient noted increase in average HR from 81 to 89 bpm, increase in heartburn, some heart palpitations. Decreased lisinopril back to 20 mg several days ago. HR trending down the past week now to 83 bpm.  Felt like heart would occasionally beat hard/fast for at least a minute. She would feel a brief \"heavy chest\" when this occurred. No other associated symptoms such as lightheadedness, dyspnea. She exercises regularly - denies any exertional chest pain and dyspnea.  EKG negative today, check labs.  Recommended zio patch. Consider stress test however no exertional symptoms, primary issue with tachycardia sensation.  However patient notes symptoms are definitely improving since the decrease in lisinopril dose and would like to monitor for now. Discussed red flag signs to be seen urgently.    - TSH with free T4 reflex; Future  - CBC with platelets and differential; Future  - EKG 12-lead complete w/read - Clinics  - ZIO PATCH MAIL OUT; Future  - Comprehensive metabolic panel (BMP + Alb, Alk Phos, " ALT, AST, Total. Bili, TP); Future  - TSH with free T4 reflex  - CBC with platelets and differential  - Comprehensive metabolic panel (BMP + Alb, Alk Phos, ALT, AST, Total. Bili, TP)    Benign essential hypertension  Well controlled, back on lisinopril 20 mg (previously 40 mg for a month). Recommended get home cuff and monitor at home.  - lisinopril (ZESTRIL) 20 MG tablet; Take 1 tablet (20 mg) by mouth daily.  - Home Blood Pressure Monitor Order for DME - ONLY FOR DME    Menopausal syndrome (hot flashes)  LMP 3/2024. Worsening hot flashes, insomnia, not feeling like herself.  She used the lexapro for hot flashes last year for over three months with continued GI symptoms so she stopped it. Was helpful though.   We have discussed HRT several times. Blood pressure in control, ASCVD risk 1.5%.  Needs progesterone for uterine protection, discussed lower risk with transdermal and she is interested in this.  We discussed the risks, benefits and alternatives of Hormone Replacement Therapy (HRT) with estrogen. The primary benefit is treatment of hot flashes when they interfere with quality of life. There is also a small benefit in prevention of osteoporosis and some women will note improvement in quality of sleep and mood. There is a small increase in the rates of  heart disease, stroke and blood clot, but the absolute risk remains low.  We discussed that she is low risk based on her personal and family history and she is a good candidate for HRT.      Patient does not have contraindications for HRT such as:   history of breast cancer  CHD  a previous venous thromboembolic (VTE) event or stroke  active liver disease  unexplained vaginal bleeding  high-risk endometrial cancer  transient ischemic attack/CVD  Migraine with aura    We consider the initiation of MHT to be a safe option for healthy, symptomatic women who are within 10 years of menopause or younger than age 60 years and who do not have contraindications to MHT.   While the Women's Health Initiative (WHI) reported adverse effects of MHT in older postmenopausal women (over age 60 years), this is not the age group that presents with new onset of menopausal symptoms. Almost all women who seek initiation of medical therapy for menopausal symptoms do so in their late 40s or 50s. Women in this age group should be reassured that the absolute risk of complications for healthy, young, postmenopausal women taking MHT for five years is very low.    - estradiol-norethindrone (COMBIPATCH) 0.05-0.14 MG/DAY bi-weekly patch; Place 1 patch over 96 hours onto the skin twice a week.    Morbid obesity (H)  She is working on lifestyle changes. Briefly discussed GLP1 agonist medications, she would like to call insurance and think about this. Would like to start HRT first. Recommended follow up for further discussion if interested.  Wt Readings from Last 4 Encounters:   05/05/25 97.7 kg (215 lb 6.4 oz)   10/06/23 93.1 kg (205 lb 3.2 oz)   05/31/23 93.4 kg (205 lb 14.4 oz)   03/07/22 86.7 kg (191 lb 1.6 oz)        Mild intermittent asthma without complication  Stable/well controlled. Continue current medications.  - albuterol (PROAIR HFA/PROVENTIL HFA/VENTOLIN HFA) 108 (90 Base) MCG/ACT inhaler; Inhale 2 puffs krishna lungs q 6 hrs for SOB/wheezing overdue for office appt with  Pankratz plz make appt now before further refills Sept 2017    Screening for lipoid disorders  The 10-year ASCVD risk score (Jose CONTRERAS, et al., 2019) is: 1.5%    Values used to calculate the score:      Age: 49 years      Sex: Female      Is Non- : No      Diabetic: No      Tobacco smoker: No      Systolic Blood Pressure: 132 mmHg      Is BP treated: Yes      HDL Cholesterol: 67 mg/dL      Total Cholesterol: 195 mg/dL   - Lipid panel reflex to direct LDL Non-fasting; Future  - Lipid panel reflex to direct LDL Fasting    Encounter for screening examination for impaired glucose regulation and diabetes  "mellitus  A1C 5.6.  - Hemoglobin A1c; Future  - Hemoglobin A1c        BMI  Estimated body mass index is 38.5 kg/m  as calculated from the following:    Height as of this encounter: 1.593 m (5' 2.72\").    Weight as of this encounter: 97.7 kg (215 lb 6.4 oz).   Weight management plan: Discussed healthy diet and exercise guidelines    Counseling  Appropriate preventive services were addressed with this patient via screening, questionnaire, or discussion as appropriate for fall prevention, nutrition, physical activity, Tobacco-use cessation, social engagement, weight loss and cognition.  Checklist reviewing preventive services available has been given to the patient.  Reviewed patient's diet, addressing concerns and/or questions.   She is at risk for psychosocial distress and has been provided with information to reduce risk.     Farhat Corbin is a 49 year old, presenting for the following:  Physical        5/5/2025     3:16 PM   Additional Questions   Roomed by Sherine   Accompanied by Self          HPI  Decreased blood pressure medication in half over the last 3 days because her pulse has been abnormally elevated which is not normal for her        Advance Care Planning    Discussed advance care planning with patient; informed AVS has link to [facility].        5/5/2025   General Health   How would you rate your overall physical health? Good   Feel stress (tense, anxious, or unable to sleep) To some extent   (!) STRESS CONCERN      5/5/2025   Nutrition   Three or more servings of calcium each day? Yes   Diet: Regular (no restrictions)   How many servings of fruit and vegetables per day? 4 or more   How many sweetened beverages each day? 0-1         5/5/2025   Exercise   Days per week of moderate/strenous exercise 4 days         5/5/2025   Social Factors   Frequency of gathering with friends or relatives More than three times a week   Worry food won't last until get money to buy more No   Food not last or " not have enough money for food? No   Do you have housing? (Housing is defined as stable permanent housing and does not include staying outside in a car, in a tent, in an abandoned building, in an overnight shelter, or couch-surfing.) Yes   Are you worried about losing your housing? No   Lack of transportation? No   Unable to get utilities (heat,electricity)? No         5/5/2025   Fall Risk   Fallen 2 or more times in the past year? No   Trouble with walking or balance? No          5/5/2025   Dental   Dentist two times every year? Yes         Today's PHQ-2 Score:       5/5/2025     3:14 PM   PHQ-2 ( 1999 [employer])   Q1: Little interest or pleasure in doing things 0   Q2: Feeling down, depressed or hopeless 1   PHQ-2 Score 1    Q1: Little interest or pleasure in doing things Not at all   Q2: Feeling down, depressed or hopeless Several days   PHQ-2 Score 1       Patient-reported           5/5/2025   Substance Use   Alcohol more than 3/day or more than 7/wk No   Do you use any other substances recreationally? No     Social History     Tobacco Use    Smoking status: Never    Smokeless tobacco: Never   Vaping Use    Vaping status: Never Used   Substance Use Topics    Alcohol use: Yes     Alcohol/week: 0.0 standard drinks of alcohol     Comment: occ    Drug use: No           4/25/2025   LAST FHS-7 RESULTS   1st degree relative breast or ovarian cancer No   Any relative bilateral breast cancer No   Any male have breast cancer No   Any ONE woman have BOTH breast AND ovarian cancer No   Any woman with breast cancer before 50yrs No   2 or more relatives with breast AND/OR ovarian cancer No   2 or more relatives with breast AND/OR bowel cancer No        Mammogram Screening - Mammogram every 1-2 years updated in Health Maintenance based on mutual decision making        5/5/2025   STI Screening   New sexual partner(s) since last STI/HIV test? No     History of abnormal Pap smear: No - age 30- 64 PAP with HPV every 5 years  recommended        Latest Ref Rng & Units 5/31/2023     2:58 PM 12/12/2018     4:33 PM 10/26/2015     6:29 PM   PAP / HPV   PAP  Negative for Intraepithelial Lesion or Malignancy (NILM)      PAP (Historical)   NIL     HPV 16 DNA Negative Negative  Negative  Negative    HPV 18 DNA Negative Negative  Negative  Negative    Other HR HPV Negative Negative  Negative  Negative      ASCVD Risk   The 10-year ASCVD risk score (Jose CONTRERAS, et al., 2019) is: 1.4%    Values used to calculate the score:      Age: 49 years      Sex: Female      Is Non- : No      Diabetic: No      Tobacco smoker: No      Systolic Blood Pressure: 132 mmHg      Is BP treated: Yes      HDL Cholesterol: 71 mg/dL      Total Cholesterol: 196 mg/dL         Reviewed and updated as needed this visit by Provider   Tobacco  Allergies  Meds  Problems  Med Hx  Surg Hx  Fam Hx            Past Medical History:   Diagnosis Date    Asthma     Fuchs' corneal dystrophy     Hypertension     Seasonal allergies      Past Surgical History:   Procedure Laterality Date    NO HISTORY OF SURGERY       Lab work is in process  Labs reviewed in EPIC  BP Readings from Last 3 Encounters:   05/05/25 132/78   02/26/25 (!) 156/95   10/06/23 115/80    Wt Readings from Last 3 Encounters:   05/05/25 97.7 kg (215 lb 6.4 oz)   10/06/23 93.1 kg (205 lb 3.2 oz)   05/31/23 93.4 kg (205 lb 14.4 oz)                  Patient Active Problem List   Diagnosis    Scalp psoriasis    Mild intermittent asthma    Benign essential hypertension    Morbid obesity (H)    Menopausal syndrome (hot flashes)     Past Surgical History:   Procedure Laterality Date    NO HISTORY OF SURGERY         Social History     Tobacco Use    Smoking status: Never    Smokeless tobacco: Never   Substance Use Topics    Alcohol use: Yes     Alcohol/week: 0.0 standard drinks of alcohol     Comment: occ     Family History   Problem Relation Age of Onset    Hypertension Mother     Lipids  "Mother     Fuch's dystrophy Mother         Transplant in one eye unsure of side it was on    Blood Disease Father         leukemia    Hypertension Father     Lipids Father     Hypertension Paternal Grandfather     Cerebrovascular Disease Paternal Grandfather     Muscular Dystrophy Son         mild.  has too    Muscular Dystrophy Son         mild.  has too    Breast Cancer Maternal Aunt 60    Breast Cancer Other     Ovarian Cancer No family hx of     Colon Cancer No family hx of              Review of Systems  CONSTITUTIONAL: NEGATIVE for fever, chills, change in weight  INTEGUMENTARY/SKIN: NEGATIVE for worrisome rashes, moles or lesions  EYES: NEGATIVE for vision changes or irritation  ENT/MOUTH: NEGATIVE for ear, mouth and throat problems  RESP: NEGATIVE for significant cough or SOB  BREAST: NEGATIVE for masses, tenderness or discharge  CV: NEGATIVE for chest pain, palpitations or peripheral edema  GI: NEGATIVE for nausea, abdominal pain, heartburn, or change in bowel habits  : NEGATIVE for frequency, dysuria, or hematuria  MUSCULOSKELETAL: NEGATIVE for significant arthralgias or myalgia  NEURO: NEGATIVE for weakness, dizziness or paresthesias  ENDOCRINE: NEGATIVE for temperature intolerance, skin/hair changes  HEME: NEGATIVE for bleeding problems  PSYCHIATRIC: NEGATIVE for changes in mood or affect     Objective    Exam  /78   Pulse 94   Temp 98.7  F (37.1  C) (Tympanic)   Resp 16   Ht 1.593 m (5' 2.72\")   Wt 97.7 kg (215 lb 6.4 oz)   LMP 03/27/2023 (Approximate)   SpO2 98%   BMI 38.50 kg/m     Estimated body mass index is 38.5 kg/m  as calculated from the following:    Height as of this encounter: 1.593 m (5' 2.72\").    Weight as of this encounter: 97.7 kg (215 lb 6.4 oz).    Physical Exam  GENERAL: alert and no distress  EYES: Eyes grossly normal to inspection, PERRL and conjunctivae and sclerae normal  HENT: ear canals and TM's normal, nose and mouth without ulcers or " lesions  NECK: no adenopathy, no asymmetry, masses, or scars  RESP: lungs clear to auscultation - no rales, rhonchi or wheezes  CV: regular rate and rhythm, normal S1 S2, no S3 or S4, no murmur, click or rub, no peripheral edema  ABDOMEN: soft, nontender, no hepatosplenomegaly, no masses and bowel sounds normal  MS: no gross musculoskeletal defects noted, no edema  SKIN: no suspicious lesions or rashes  NEURO: Normal strength and tone, mentation intact and speech normal  PSYCH: mentation appears normal, affect normal/bright    EKG - appears normal, NSR, normal axis, normal intervals, no acute ST/T changes c/w ischemia, no LVH by voltage criteria, unchanged from previous tracings  I personally read, reviewed, and advised patient of EKG results.     Signed Electronically by: Lily Fine PA-C

## 2025-05-05 NOTE — PATIENT INSTRUCTIONS
GLP1 agonist for weight management - zepbound and wegovy  Can also look into weight consult with Gail Nowak NP    Patient Education   Preventive Care Advice   This is general advice given by our system to help you stay healthy. However, your care team may have specific advice just for you. Please talk to your care team about your preventive care needs.  Nutrition  Eat 5 or more servings of fruits and vegetables each day.  Try wheat bread, brown rice and whole grain pasta (instead of white bread, rice, and pasta).  Get enough calcium and vitamin D. Check the label on foods and aim for 100% of the RDA (recommended daily allowance).  Lifestyle  Exercise at least 150 minutes each week  (30 minutes a day, 5 days a week).  Do muscle strengthening activities 2 days a week. These help control your weight and prevent disease.  No smoking.  Wear sunscreen to prevent skin cancer.  Have a dental exam and cleaning every 6 months.  Yearly exams  See your health care team every year to talk about:  Any changes in your health.  Any medicines your care team has prescribed.  Preventive care, family planning, and ways to prevent chronic diseases.  Shots (vaccines)   HPV shots (up to age 26), if you've never had them before.  Hepatitis B shots (up to age 59), if you've never had them before.  COVID-19 shot: Get this shot when it's due.  Flu shot: Get a flu shot every year.  Tetanus shot: Get a tetanus shot every 10 years.  Pneumococcal, hepatitis A, and RSV shots: Ask your care team if you need these based on your risk.  Shingles shot (for age 50 and up)  General health tests  Diabetes screening:  Starting at age 35, Get screened for diabetes at least every 3 years.  If you are younger than age 35, ask your care team if you should be screened for diabetes.  Cholesterol test: At age 39, start having a cholesterol test every 5 years, or more often if advised.  Bone density scan (DEXA): At age 50, ask your care team if you should  have this scan for osteoporosis (brittle bones).  Hepatitis C: Get tested at least once in your life.  STIs (sexually transmitted infections)  Before age 24: Ask your care team if you should be screened for STIs.  After age 24: Get screened for STIs if you're at risk. You are at risk for STIs (including HIV) if:  You are sexually active with more than one person.  You don't use condoms every time.  You or a partner was diagnosed with a sexually transmitted infection.  If you are at risk for HIV, ask about PrEP medicine to prevent HIV.  Get tested for HIV at least once in your life, whether you are at risk for HIV or not.  Cancer screening tests  Cervical cancer screening: If you have a cervix, begin getting regular cervical cancer screening tests starting at age 21.  Breast cancer scan (mammogram): If you've ever had breasts, begin having regular mammograms starting at age 40. This is a scan to check for breast cancer.  Colon cancer screening: It is important to start screening for colon cancer at age 45.  Have a colonoscopy test every 10 years (or more often if you're at risk) Or, ask your provider about stool tests like a FIT test every year or Cologuard test every 3 years.  To learn more about your testing options, visit:   .  For help making a decision, visit:   https://bit.ly/ip64054.  Prostate cancer screening test: If you have a prostate, ask your care team if a prostate cancer screening test (PSA) at age 55 is right for you.  Lung cancer screening: If you are a current or former smoker ages 50 to 80, ask your care team if ongoing lung cancer screenings are right for you.  For informational purposes only. Not to replace the advice of your health care provider. Copyright   2023 [employer]. All rights reserved. Clinically reviewed by the [facility]. [employer] [id number] - REV 01/24.  Learning About Stress  What is stress?     Stress is your body's response to a hard  situation. Your body can have a physical, emotional, or mental response. Stress is a fact of life for most people, and it affects everyone differently. What causes stress for you may not be stressful for someone else.  A lot of things can cause stress. You may feel stress when you go on a job interview, take a test, or run a race. This kind of short-term stress is normal and even useful. It can help you if you need to work hard or react quickly. For example, stress can help you finish an important job on time.  Long-term stress is caused by ongoing stressful situations or events. Examples of long-term stress include long-term health problems, ongoing problems at work, or conflicts in your family. Long-term stress can harm your health.  How does stress affect your health?  When you are stressed, your body responds as though you are in danger. It makes hormones that speed up your heart, make you breathe faster, and give you a burst of energy. This is called the fight-or-flight stress response. If the stress is over quickly, your body goes back to normal and no harm is done.  But if stress happens too often or lasts too long, it can have bad effects. Long-term stress can make you more likely to get sick, and it can make symptoms of some diseases worse. If you tense up when you are stressed, you may develop neck, shoulder, or low back pain. Stress is linked to high blood pressure and heart disease.  Stress also harms your emotional health. It can make you hogue, tense, or depressed. Your relationships may suffer, and you may not do well at work or school.  What can you do to manage stress?  You can try these things to help manage stress:   Do something active. Exercise or activity can help reduce stress. Walking is a great way to get started. Even everyday activities such as housecleaning or yard work can help.  Try yoga or imani chi. These techniques combine exercise and meditation. You may need some training at first to  learn them.  Do something you enjoy. For example, listen to music or go to a movie. Practice your hobby or do volunteer work.  Meditate. This can help you relax, because you are not worrying about what happened before or what may happen in the future.  Do guided imagery. Imagine yourself in any setting that helps you feel calm. You can use online videos, books, or a teacher to guide you.  Do breathing exercises. For example:  From a standing position, bend forward from the waist with your knees slightly bent. Let your arms dangle close to the floor.  Breathe in slowly and deeply as you return to a standing position. Roll up slowly and lift your head last.  Hold your breath for just a few seconds in the standing position.  Breathe out slowly and bend forward from the waist.  Let your feelings out. Talk, laugh, cry, and express anger when you need to. Talking with supportive friends or family, a counselor, or a [employer] leader about your feelings is a healthy way to relieve stress. Avoid discussing your feelings with people who make you feel worse.  Write. It may help to write about things that are bothering you. This helps you find out how much stress you feel and what is causing it. When you know this, you can find better ways to cope.  What can you do to prevent stress?  You might try some of these things to help prevent stress:  Manage your time. This helps you find time to do the things you want and need to do.  Get enough sleep. Your body recovers from the stresses of the day while you are sleeping.  Get support. Your family, friends, and community can make a difference in how you experience stress.  Limit your news feed. Avoid or limit time on social media or news that may make you feel stressed.  Do something active. Exercise or activity can help reduce stress. Walking is a great way to get started.  Where can you learn more?  Go to https://www.healthwise.net/patiented  Enter N032 in the search box to learn more  "about \"Learning About Stress.\"  Current as of: October 24, 2024  Content Version: 14.4    2122-2203 [employer].   Care instructions adapted under license by your healthcare professional. If you have questions about a medical condition or this instruction, always ask your healthcare professional. [employer] disclaims any warranty or liability for your use of this information.       "

## 2025-05-07 ENCOUNTER — RESULTS FOLLOW-UP (OUTPATIENT)
Dept: FAMILY MEDICINE | Facility: CLINIC | Age: 50
End: 2025-05-07

## 2025-09-02 DIAGNOSIS — N95.1 MENOPAUSAL SYNDROME (HOT FLASHES): ICD-10-CM

## 2025-09-03 RX ORDER — ESTRADIOL/NORETHINDRONE ACETATE TRANSDERMAL SYSTEM .05; .14 MG/D; MG/D
PATCH, EXTENDED RELEASE TRANSDERMAL
Qty: 8 PATCH | Refills: 3 | Status: SHIPPED | OUTPATIENT
Start: 2025-09-03